# Patient Record
Sex: FEMALE | URBAN - METROPOLITAN AREA
[De-identification: names, ages, dates, MRNs, and addresses within clinical notes are randomized per-mention and may not be internally consistent; named-entity substitution may affect disease eponyms.]

---

## 2017-06-12 ENCOUNTER — TRANSFERRED RECORDS (OUTPATIENT)
Dept: HEALTH INFORMATION MANAGEMENT | Facility: CLINIC | Age: 32
End: 2017-06-12

## 2017-07-05 ENCOUNTER — MEDICAL CORRESPONDENCE (OUTPATIENT)
Dept: HEALTH INFORMATION MANAGEMENT | Facility: CLINIC | Age: 32
End: 2017-07-05

## 2017-07-26 ENCOUNTER — TRANSFERRED RECORDS (OUTPATIENT)
Dept: HEALTH INFORMATION MANAGEMENT | Facility: CLINIC | Age: 32
End: 2017-07-26

## 2017-07-31 ENCOUNTER — PRE VISIT (OUTPATIENT)
Dept: RHEUMATOLOGY | Facility: CLINIC | Age: 32
End: 2017-07-31

## 2017-07-31 NOTE — TELEPHONE ENCOUNTER
1.  Date/reason for appt:  08/09/17   RA    2.  Referring provider:  Dr Mendes, [facility]    3.  Call to patient (Yes / No - short description):  Yes, left vmsg for pt to return call    Where else may she have been seen/when related to this issue    Need email to send JOE (s)    4.  Previous care at / records requested from:  [facility]

## 2017-08-03 NOTE — TELEPHONE ENCOUNTER
Pt stated she's been going to [facility] [place] locations for some time, sees Rheum.  Has records, blood work, injections and imaging.    Emailed JOE's to [email or web address]

## 2017-08-03 NOTE — TELEPHONE ENCOUNTER
Records received from [facility].   Included  Office notes: 7/26/17  Other: med list     Missing/needed records: Additional records in [place]

## 2017-08-04 NOTE — TELEPHONE ENCOUNTER
Received signed JOE forms and faxed with cover sheets for RA records to [facility]

## 2017-08-04 NOTE — TELEPHONE ENCOUNTER
Records received from [facility].   Included  Office notes: 6/16/17, 6/12/17, 6/11/13, 4/14/10, 3/22/09, 12/5/08  Radiology reports: cervical 12/5/08  Other: labs     Per fax, will need to request image if needed.

## 2017-08-09 ENCOUNTER — OFFICE VISIT (OUTPATIENT)
Dept: RHEUMATOLOGY | Facility: CLINIC | Age: 32
End: 2017-08-09
Attending: NURSE PRACTITIONER
Payer: COMMERCIAL

## 2017-08-09 VITALS
SYSTOLIC BLOOD PRESSURE: 123 MMHG | OXYGEN SATURATION: 98 % | DIASTOLIC BLOOD PRESSURE: 80 MMHG | WEIGHT: 155.7 LBS | HEART RATE: 67 BPM | BODY MASS INDEX: 32.68 KG/M2 | HEIGHT: 58 IN | TEMPERATURE: 97.9 F

## 2017-08-09 DIAGNOSIS — M25.50 PAIN IN JOINT, MULTIPLE SITES: Primary | ICD-10-CM

## 2017-08-09 DIAGNOSIS — M79.7 FIBROMYALGIA: ICD-10-CM

## 2017-08-09 DIAGNOSIS — M25.50 PAIN IN JOINT, MULTIPLE SITES: ICD-10-CM

## 2017-08-09 LAB
ALBUMIN SERPL-MCNC: 3.2 G/DL (ref 3.4–5)
ALP SERPL-CCNC: 75 U/L (ref 40–150)
ALT SERPL W P-5'-P-CCNC: 19 U/L (ref 0–50)
ANION GAP SERPL CALCULATED.3IONS-SCNC: 7 MMOL/L (ref 3–14)
AST SERPL W P-5'-P-CCNC: 14 U/L (ref 0–45)
BASOPHILS # BLD AUTO: 0 10E9/L (ref 0–0.2)
BASOPHILS NFR BLD AUTO: 0.3 %
BILIRUB SERPL-MCNC: 0.4 MG/DL (ref 0.2–1.3)
BUN SERPL-MCNC: 9 MG/DL (ref 7–30)
CALCIUM SERPL-MCNC: 8.8 MG/DL (ref 8.5–10.1)
CHLORIDE SERPL-SCNC: 107 MMOL/L (ref 94–109)
CO2 SERPL-SCNC: 24 MMOL/L (ref 20–32)
CREAT SERPL-MCNC: 0.67 MG/DL (ref 0.52–1.04)
CRP SERPL-MCNC: 4.5 MG/L (ref 0–8)
DIFFERENTIAL METHOD BLD: NORMAL
EOSINOPHIL # BLD AUTO: 0.4 10E9/L (ref 0–0.7)
EOSINOPHIL NFR BLD AUTO: 3.5 %
ERYTHROCYTE [DISTWIDTH] IN BLOOD BY AUTOMATED COUNT: 12.1 % (ref 10–15)
ERYTHROCYTE [SEDIMENTATION RATE] IN BLOOD BY WESTERGREN METHOD: 19 MM/H (ref 0–20)
GFR SERPL CREATININE-BSD FRML MDRD: ABNORMAL ML/MIN/1.7M2
GLUCOSE SERPL-MCNC: 101 MG/DL (ref 70–99)
HCT VFR BLD AUTO: 39.3 % (ref 35–47)
HGB BLD-MCNC: 13.3 G/DL (ref 11.7–15.7)
IMM GRANULOCYTES # BLD: 0 10E9/L (ref 0–0.4)
IMM GRANULOCYTES NFR BLD: 0.3 %
LYMPHOCYTES # BLD AUTO: 3.5 10E9/L (ref 0.8–5.3)
LYMPHOCYTES NFR BLD AUTO: 34.3 %
MCH RBC QN AUTO: 32.6 PG (ref 26.5–33)
MCHC RBC AUTO-ENTMCNC: 33.8 G/DL (ref 31.5–36.5)
MCV RBC AUTO: 96 FL (ref 78–100)
MONOCYTES # BLD AUTO: 0.5 10E9/L (ref 0–1.3)
MONOCYTES NFR BLD AUTO: 5.1 %
NEUTROPHILS # BLD AUTO: 5.8 10E9/L (ref 1.6–8.3)
NEUTROPHILS NFR BLD AUTO: 56.5 %
NRBC # BLD AUTO: 0 10*3/UL
NRBC BLD AUTO-RTO: 0 /100
PLATELET # BLD AUTO: 209 10E9/L (ref 150–450)
POTASSIUM SERPL-SCNC: 4.3 MMOL/L (ref 3.4–5.3)
PROT SERPL-MCNC: 6.9 G/DL (ref 6.8–8.8)
RBC # BLD AUTO: 4.08 10E12/L (ref 3.8–5.2)
SODIUM SERPL-SCNC: 138 MMOL/L (ref 133–144)
WBC # BLD AUTO: 10.2 10E9/L (ref 4–11)

## 2017-08-09 PROCEDURE — 86140 C-REACTIVE PROTEIN: CPT | Performed by: NURSE PRACTITIONER

## 2017-08-09 PROCEDURE — 99212 OFFICE O/P EST SF 10 MIN: CPT | Mod: ZF

## 2017-08-09 PROCEDURE — 80053 COMPREHEN METABOLIC PANEL: CPT | Performed by: NURSE PRACTITIONER

## 2017-08-09 PROCEDURE — 85652 RBC SED RATE AUTOMATED: CPT | Performed by: NURSE PRACTITIONER

## 2017-08-09 PROCEDURE — 85025 COMPLETE CBC W/AUTO DIFF WBC: CPT | Performed by: NURSE PRACTITIONER

## 2017-08-09 PROCEDURE — 36415 COLL VENOUS BLD VENIPUNCTURE: CPT | Performed by: NURSE PRACTITIONER

## 2017-08-09 RX ORDER — FLUTICASONE PROPIONATE 50 MCG
1 SPRAY, SUSPENSION (ML) NASAL 2 TIMES DAILY
COMMUNITY

## 2017-08-09 ASSESSMENT — PAIN SCALES - GENERAL: PAINLEVEL: EXTREME PAIN (9)

## 2017-08-09 NOTE — PROGRESS NOTES
"Formerly [facility] - Rheumatology Clinic Visit        Chiqui Loyd  is a 32 year old here for possible CTD and +scleroderma KARLA. Previous dx juvenile rheumatoid arthritis (neck, wrists, left knee), +scleroderma low positive 1.6, raynauds and fibromyalgia.  6/2017 -JAMILAH,  -fior, -RNP,  -sm -ssa- ssb +low positive scl-70 1.6. 6/2015 -RF -CCP. -HCV -HIV NL TSH 9-2016 +CRP in past 20 in 2009.  Past meloxicam. Piroxicam, methotrexate, ibuprofen. Allergy-sulfa (rash). Cortisone injections 3 in past, 2 weeks ago 6-27 +inflammation on aspiration (cloudy/yellow fluid Tot nuc cell 19,740, neutrophil 53%, lymp 24%, mono 23% and no crystals)  left knee. Prednisone-not tolerated (mood swings, tired) not improved diffuse pains only took for 3 days this was unsure mg this last was within 1 yr. NSAIDs no help. Undergoing for disability. Here with uncle. Records scanned (didnt have all rheum records) and care everywhere accessed.     Records from [place]--last OV 6-2017 (2015) and cervical reports .  Dx 10 y/o RA at [facility] [joint pain, weight loss, the eladio's apple, wrists], then seen Dr. Smith [facility], then Dr. Romero ([place]--cervical spine mobility issues, knee synovitis ). Dr. Babin (retired) her PCP took over then \"in and out of remission from her pregnancies\"     When she was young, joint pains [wrists, loosing weight, not able to move neck, eladio's apple 'was causing not able for eat\"]--sulfalalzine --allergy, then took methotrexate oral once a week (helped, and tolerated) and a 2nd medication but not sure thinks she was taking until was 18 yr old. Controlled and in remission, no flares. 2004 undergoing divorce, flexaril, tramadol, toradol and other not DMARD told this was not was RA was something else. Then seen Dr. Caldera dx fibromyalgia now undergoing at [place] this was a 2 day program June 2015. Got pregnant. last rheumatology in [place] Dr. Caldera, last appt 6- " "established in 2008 wishes to transfer care to here. Dr. Caldera ordered echo and PFT -not done will be done after pain management consult. No meds from Dr. Caldera Dx as \"widespread chronic pain\" told was no evidence of MITRA or scleroderma, f/u in 1 year and who referred to chronic pain management. Will be doing this at [place]. Last 7- who ordered PT. Will be meeting with pyschiatrist for possible bipolar.  The pain clinic- Will be doing PT 2x week, seeing psychiatry to go off the cymbalta as not helping (lyrica not helped), going on to zoloft. The PCP tried have failed for this pain. PCP ordered left knee injection 2 weeks ago, then Dr. Willis didn't feel this would help so Dr. Michael (PCP) did an injection    Diffuse pain, everyday widespread pain 7 /10, the worst in her how back then left knee, shoulders and base of the neck 9/10. Still hard to swallow. Pain has been progressively worse \"now its burning shoulders and down my back\", drop things, hands turn white, feels like her low back is going to collapse and \"crumble\" and the knee is \"jelly\", poor sleep. Mornings is the worst, some days its better as the day goes on. Most of the pain in neck shoulder, lowers back. Left knee and toes \"tingling blue and numb\". Left knee swells as times, when bend her fingers are sore and weak, achey are hard to bend back. Hands get cold and hard to feel then then the right 2-3 fingers turn white but her toes goes white then purple (tips of the fingers). Progressive symptoms since this past winter. Sensitive to the cold. +\"fishnet pattern on my legs\".     Muscles, joints all bother (mostly wrists, finger joints, left knee) , her SI bother +back pain when laying down not helped with cold heat stretching or walking, worse with movements. Low back always sore and tender, but sometime SI if touched. Meloxicam --stopped due to stomach issues (seen in ED). Denies tick bites or dactylitis.     Nothing has helped she reports. No recent " "travels     PMH:  Medical-anxiety, yeast, UTI, depression, ?bipolar--agreed was not after seen psych in 2013 turned out was ADHD now on adderall, dx JRA at youth,gestation diabetes x2, pre-eclampasia, influenza A in 2016   Surgical-None   No blood transfusions  Injury-tailbone fracture     FH:  No autoimmune disorders, psoriasis, UC, crohn's, SLE, RA, PsA, gout.  No MS or cancer in family  Mother-bipolar, paranoid schizoprhenia, mild MR  Father-Not known  Siblings-1/2 sister testing now anxiety depression fibromyalia, ADD and bipolar  Children-healthy      SH:  Rare Alcohol. + 1 PPDSmoking. No IVDU. 5 Children. Right handed.  Not working since Feb 2016     [facility] personally reviewed and updated by me.    ROS:  Negative hairloss, keratoconjunctivitis sicca, pleurisy, oral/nasal or ulcerations, inflammatory eye disease, inflammatory bowel disease, dactylitis, tenosynovitis, or enthespathy, blood clots, gout, psoriasis, UC, crohn's. No temporal headache, no jaw claudication, no scalp tenderness, vision changes, carotidynia, cough  +see above   +sun sensitivities (hot and skin red pattern and face \"butterfly race\", dizzy, threw-up)  +question if inflammatory bowel disease --went to ED ([place]) working with PCP at this time   +miscarriages at 5-6 weeks   +bowels are diarrhea then constipation, no blood in stool nor ABD pain ok with gasX   CONSTITUTIONAL: No fevers, night sweats or unintentional weight change. No acute distress, swollen glands  EYES: No vision change, diplopia, pain in eyes or red eyes   EARS, NOSE, MOUTH, THROAT: No tinnitus or hearing change, no epistaxis or nasal discharge, no oral lesions, throat clear. Normal saliva pool.  No drymouth. No thyroid enlargement  CARDIOVASCULAR: No chest pain, palpitations, or pain with walking, no orthopnea or PND   RESPIRATORY: No dyspnea, cough, shortness of breath or wheezing. No pleurisy.   GI: No nausea, vomiting, diarrhea or constipation, no abdominal pain, or blood in " "stools.   : No change in urine, no dysuria or hematuria   MUSCKL:  No enthesitis, plantar fascitis or heel pain.   INTEGUMENTARY: No concerning lesions or moles   NEURO: No loss of strength or sensation, no numbness or tingling, no tremor, no dizziness, no headache. No falls   ENDO: No polyuria or polydipsia, no temperature intolerance   HEME/LYMPH:No concerning bumps, bleeding problems, or swollen lymph nodes.    Otherwise 14 point ROS obtained, reviewed and found negative.     Physical exam:  Vitals: Blood pressure 123/80, pulse 67, temperature 97.9  F (36.6  C), temperature source Oral, height 1.473 m (4' 10\"), weight 70.6 kg (155 lb 11.2 oz), SpO2 98 %.  Wt Readings from Last 4 Encounters:   17 70.6 kg (155 lb 11.2 oz)     Constitutional: WD-WN-WG cooperative  Eyes: nl EOM, PERRLA, vision, conjunctiva, sclera  ENT: nl external ears, nose, hearing, lips, teeth, gums, throat  Neck: no mass or thyroid enlargement  Resp: lungs clear to auscultation, nl to palpation, nl effort  CV: RRR, no murmurs, rubs or gallops, no edema  GI: no ABD mass or tenderness, no HSM  : not tested  Lymph: no cervical or epitrochlear nodes  MS: tender wrists, erythema of fingers, tender alone her spine and all soft tissues. Livedo rash on legs. Tender in ankles and MTPs. +tender in her SI/ITB and traochanteric bursa. Slow to move. +periuncle erythema. All TMJ, neck, shoulder, elbow, wrist, hand, spine, hip, knee, ankle, and foot joints were examined and otherwise found normal. Normal  strength. No active synovitis or deformity. Full ROM. Normal prayer sign. Negative Negative Lhermitte's sign.   Skin: no nail pitting, alopecia, rash  Neuro: nl cranial nerves, strength, sensation, DTRs.   Psych: nl judgement, orientation, memory, affect.      Labs/Imagin Neg chlamydia , gonorrhea, syphilis   HX Total Nuc Cells BF  2017  Comment:   Serous Fluid (Peritoneal, Pleural, Pericardial) Reference Range:  < 500 " cells/mcL  Synovial Fluid Reference Range:  < 150 cells/mcL  BAL Reference Range:  No normal range has been established for total nucleated cell counts     CELLSMCL   HXSource BF      HXBody Fluid Color Yellow     HX Gross Appear BF Cloudy     Neutrophils, Body Fluid 53  Comment:   Serous Fluid (Peritoneal, Pleural, Pericardial) Reference Range:  < 25%  Synovial Fluid Reference Range:  < 25%  BAL Reference Range:  No normal range has been established for the differential.         HXLymphocytes BF 24  Comment:   Serous Fluid (Peritoneal, Pleural, Pericardial) Reference Range:  N/A  Synovial Fluid Reference Range:  < 75%  BAL Reference Range:  No normal range has been established for the differential.         HXMono/Macro BF 23  Comment:   Serous Fluid (Peritoneal, Pleural, Pericardial) Reference Range:  N/A  Synovial Fluid Reference Range:  < 70%  BAL Reference Range:  No normal range has been established for the differential.        R Knee Bilat AP Std + Knee Lt 3 view  6/27/2017 16:12:10   Result Impression    Small effusion, otherwise negative left knee.    Result Narrative       EXAM: XR Knee Bilat AP Std + Knee Lt 3 view     INDICATION: knee pain, hx JRA     COMPARISON: Left knee of 6/8/2009.      FINDINGS: There is a small knee joint effusion. No fracture or   destructive lesion is identified. Knee joint spaces are preserved   without significant degenerative or erosive arthritis. Incidentally   visualized right knee on the AP and PA views is unremarkable.     Addendum by Pete Bravo M.D. on 04/18/2017  8:41 AM  RAD^^^MA  CT Abdomen/Pelvis w/ contrast  4/18/2017 08:41:48   Result Narrative       Exam: CT of abdomen and pelvis     History:   Abdominal pain      Comparison: None     Technique: CT of the abdomen and pelvis with IV contrast and without   oral contrast. Images were obtained from base of lungs to below the   pelvis. The images were interpreted in bone, abdominal, and lung   windows.      Findings: Lung bases are clear. The upper abdominal organs are within   normal limits. There is no upper abdominal adenopathy. The upper   abdominal vasculature is patent. There is no free fluid in the pelvis.   The appendix is within normal limits. There is a dominant right   ovarian follicle. The genitourinary structures of the pelvis are   within normal limits. There are scattered diverticula of the colon.   There is a moderate amount of stool within the proximal colon. There   is no abnormally dilated loop of large or small bowel. There is a   small left renal stone. There is no obstructing nephro or   ureterolithiasis. There is no worrisome bone lesion.     Impression:    1. No acute abdominal pathology identified. Nonobstructing left renal   stone seen.   2. Mild diverticulosis with questionable descending colonic wall   thickening versus decompressed colon. Suggest correlation with   clinical symptoms.      Addendum by ProviderPete M.D. on 07/30/2015 11:11 AM  RAD^^^MA  XR Cervical Spine 4 views  7/30/2015 11:11:32   Result Narrative       Exam:   XR Cervical Spine 4 or 5 views     Clinical history: JRA with abnormal MRI     Comparison: 5/11/15     Findings: The cervical spine vertebral body height and alignment is   preserved. The soft tissues are within normal limits. There is no   cortical lucency to suggest fracture. Flexion and extension views are   within normal limits.     Impression: No evidence of instability.      Stable cervical spine, no fracture or degenerative change.   Unchanged widening of the articulation of the odontoid and anterior   arch of C1.    Result Narrative   3-2017     EXAM: XR Cervical Spine 4 or 5 views     INDICATION: Neck pain, RA, MRI abnormality of Dens 2015     COMPARISON: 7/30/2015.      FINDINGS: Prevertebral soft tissues are unremarkable. The 7 cervical   vertebrae are well visualized and normal in height without evidence of   fracture or destructive lesions.  Again noted is some widening of the   articulation of the odontoid and anterior arch of C1 but this is   stable and unchanged. Disc spaces are preserved without degenerative   change. Oblique views show normal facets and neural foramina   bilaterally.      MR Cervical Spine w/ + w/o contrast  7/22/2015 16:44:41Addendum by ProviderPete M.D. on 07/22/2015  4:30 PM  RAD^^^MA  MR Cervical Spine w/ + w/o contrast  7/22/2015 16:30:00   Result Narrative       Multiecho and multiplanar views of of the cervical spine were   obtained prior to and following the IV administration of 7 mL of   contrast. This was compared to a previous study of 06/12/2015.     There is unchanged pannus formation surrounding the tip of the dens,   producing unchanged, 7 mm of widening between the anterior arch of C1   and the dens. The tip of the dens is again noted to be eroded, as   well as erosion of a portion of the clivus. These findings are   stable.     There is no pathological enhancement. Again noted is congenital   narrowing of the spinal canal throughout the cervical spine. There is   mild unchanged disc bulging at the C4-5 level, with no evidence of   any significant spinal stenosis or neural foraminal stenosis. The   visualized brainstem appears normal.     Impression:   1. Unchanged pannus formation involving the C1 level as described   above, producing erosion of the tip of the dens as well as a portion   of the clivus.   2. Unchanged mild diffuse posterior disc bulging at the C4-5 level,   with no significant spinal stenosis or neural foraminal stenosis.   3. 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            Impression/Plan:  1. Polyarthralgias. Previous dx of JRA  2. Low positive scleroderma KARLA--Dr. Caldera ordered echo and complete PFTs  3. Raynauds syndrome   4. Smoker   5. Neck pain, x-rays 3-2017; MRI 7-2015 congenital narrowing with unchanged pannus C1-erosion tip of dens  6.  Fibromyalgia. Dx at fibromyalgia clinic. Per pain team   6. Intolerant to prednisone   7. Anxiety, depression, ADHD, chronic pain. Under care of psych and pain team   8. Allergy sulfa     This is a 31 y/o female with dx JRA at age 11 previously treated with multiple NSAIDs and methotrexate, essentially went in and out of remission during her pregnancies then now been under the care of Dr. Caldera ([place]).     Complaints of raynaud's syndrome (toes, finger turn white), low positive scleroderma KARLA with the other serologies negative, diffuse neck/spine and SI pain, diffuse overall pain, swallowing issues, complaints of sun-sensitivities and butterfly rash, morning symptoms without clear improvement with NSAIDS (more recently meloxicam), and not able to tolerate prednisone. Recent knee aspiration showing +inflammation and injection, improved. The pain is diffuse, severe and impacting her QoL/function. Exam shows tenderness alone her spine, and diffusely on her joints and soft tissues, and SI joints where this does bring her to tears. I don't appreciate any clear synovitis or dactylitis on her exam, nor is meloxicam giving her relief. Not able to do a prednisone trial. She has previously been on MTX when she was younger, but the history on this is unclear when and for how how or if she had a response. She has a negative JAMILAH, RF and CCP. Smoking is a risk factor for RA. However, her recent knee aspiration showed +inflammation process and STD testing negative (with OB). Not able to use sulfa.     -I will ask her to do labs, x-rays of feet, hands, SI joints and wrists to evaluate for erosions.   -I will ask her to RTC with rheum MD for further evaluation given +scleroderma KARLA and raynauds, and other non-specific symptoms. Also to order echo, complete PFT and other testing as deemed appropriate.    --She should f/u with PCP about her swallowing     The patient understood the rational for the diagnosis and treatment plan.  Patient shared in the decision making. All questions were answered to best of my ability and the patient's satisfaction  Johan MARTINEZ, CNP, MSN  [facility]  Department of Rheumatology & Autoimmune Disorders

## 2017-08-09 NOTE — MR AVS SNAPSHOT
After Visit Summary   8/9/2017    Chiqui Loyd    MRN: [id number]           Patient Information     Date Of Birth          1985        Visit Information        Provider Department      8/9/2017 8:00 AM Johan Zhou APRN [facility] Rheumatology        Today's Diagnoses     Pain in joint, multiple sites    -  1    Fibromyalgia           Follow-ups after your visit        Follow-up notes from your care team     Return for Labs + X-rays Today.      Your next 10 appointments already scheduled     Aug 09, 2017 10:00 AM CDT   XR SACROILIAC JOINT 1/2 VIEWS with UCXR1   [facility] ([facility])    [address] [place] [id number] [phone]           Please bring a list of your current medicines to your exam. (Include vitamins, minerals and over-thecounter medicines.) Leave your valuables at home.  Tell your doctor if there is a chance you may be pregnant.  You do not need to do anything special for this exam.            Aug 09, 2017 10:20 AM CDT   XR WRIST BILATERAL 2 VIEWS with UCXR1   [facility] ([facility])    [address] [id number] [phone]           Please bring a list of your current medicines to your exam. (Include vitamins, minerals and over-thecounter medicines.) Leave your valuables at home.  Tell your doctor if there is a chance you may be pregnant.  You do not need to do anything special for this exam.            Aug 09, 2017 10:40 AM CDT   XR FOOT BILATERAL G/E 3 VIEWS with UCXR1   [facility] ([facility])    [address] [id number] [phone]           Please bring a list of your current medicines to your exam. (Include vitamins, minerals and over-thecounter medicines.) Leave your valuables at home.  Tell your doctor if there is a chance you may  be pregnant.  You do not need to do anything special for this exam.            Aug 09, 2017 11:05 AM CDT   XR HAND BILATERAL G/E 3 VIEWS with UCXR1   [facility] ([facility])    [address]  1st Floor  [facility] [id number] [phone]           Please bring a list of your current medicines to your exam. (Include vitamins, minerals and over-thecounter medicines.) Leave your valuables at home.  Tell your doctor if there is a chance you may be pregnant.  You do not need to do anything special for this exam.            Aug 17, 2017  3:00 PM CDT   (Arrive by 2:45 PM)   Return Visit with Valerie Gamez MD   [facility]umatology ([facility])    [address]  3rd Floor  [facility] [id number] [phone]              Future tests that were ordered for you today     Open Future Orders        Priority Expected Expires Ordered    CBC with platelets differential Routine 8/9/2017 9/9/2017 8/9/2017    CRP inflammation Routine 8/9/2017 9/9/2017 8/9/2017    Erythrocyte sedimentation rate auto Routine 8/9/2017 9/9/2017 8/9/2017    Comprehensive metabolic panel Routine 8/9/2017 9/9/2017 8/9/2017    XR Hand Bilateral G/E 3 Views Routine 8/9/2017 9/9/2017 8/9/2017    XR Foot Bilateral G/E 3 Views Routine 8/9/2017 9/9/2017 8/9/2017    XR Wrist Bilateral 2 Views Routine 8/9/2017 9/9/2017 8/9/2017    XR Sacroiliac Joint 1/2 Views Routine 8/9/2017 9/9/2017 8/9/2017            Who to contact     If you have questions or need follow up information about today's clinic visit or your schedule please contact [facility] RHEUMATOLOGY directly at [phone].  Normal or non-critical lab and imaging results will be communicated to you by MyChart, letter or phone within 4 business days after the clinic has received the results. If you do not hear from us within 7 days, please contact the clinic through MyChart or phone. If you have a  "critical or abnormal lab result, we will notify you by phone as soon as possible.  Submit refill requests through [employer] or call your pharmacy and they will forward the refill request to us. Please allow 3 business days for your refill to be completed.          Additional Information About Your Visit        realSociablehart Information     [employer] gives you secure access to your electronic health record. If you see a primary care provider, you can also send messages to your care team and make appointments. If you have questions, please call your primary care clinic.  If you do not have a primary care provider, please call [phone] and they will assist you.        Care EveryWhere ID     This is your Care EveryWhere ID. This could be used by other organizations to access your [place] medical records  [id number]        Your Vitals Were     Pulse Temperature Height Pulse Oximetry BMI (Body Mass Index)       67 97.9  F (36.6  C) (Oral) 1.473 m (4' 10\") 98% 32.54 kg/m2        Blood Pressure from Last 3 Encounters:   08/09/17 123/80    Weight from Last 3 Encounters:   08/09/17 70.6 kg (155 lb 11.2 oz)               Primary Care Provider    None Specified       No primary provider on file.        Equal Access to Services     [facility]RA [facility] : Hadii jer Avelar, waaxda ludell, qaybta kaalmada jaron, faviola parsons. So [facility] [phone].    ATENCIÓN: Si habla español, tiene a mcghee disposición servicios gratuitos de asistencia lingüística. Llame al [phone].    We comply with applicable federal civil rights laws and [place] laws. We do not discriminate on the basis of race, color, national origin, age, disability sex, sexual orientation or gender identity.            Thank you!     Thank you for choosing [facility]  for your care. Our goal is always to provide you with excellent care. Hearing back from our patients is one way we can continue to improve our services. " Please take a few minutes to complete the written survey that you may receive in the mail after your visit with us. Thank you!             Your Updated Medication List - Protect others around you: Learn how to safely use, store and throw away your medicines at www.disposemymeds.org.      Notice  As of 8/9/2017  9:49 AM    You have not been prescribed any medications.

## 2017-08-09 NOTE — LETTER
"8/9/2017      RE: Chiqui Loyd  [address]  [facility] [address]       [facility] - Rheumatology Clinic Visit        Chiqui Loyd  is a 32 year old here for possible CTD and +scleroderma KARLA. Previous dx juvenile rheumatoid arthritis (neck, wrists, left knee), +scleroderma low positive 1.6, raynauds and fibromyalgia.  6/2017 -JAMILAH,  -fior, -RNP,  -sm -ssa- ssb +low positive scl-70 1.6. 6/2015 -RF -CCP. -HCV -HIV NL TSH 9-2016 +CRP in past 20 in 2009.  Past meloxicam. Piroxicam, methotrexate, ibuprofen. Allergy-sulfa (rash). Cortisone injections 3 in past, 2 weeks ago 6-27 +inflammation on aspiration (cloudy/yellow fluid Tot nuc cell 19,740, neutrophil 53%, lymp 24%, mono 23% and no crystals)  left knee. Prednisone-not tolerated (mood swings, tired) not improved diffuse pains only took for 3 days this was unsure mg this last was within 1 yr. NSAIDs no help. Undergoing for disability. Here with uncle. Records scanned (didnt have all rheum records) and care everywhere accessed.     Records from [place]--last OV 6-2017 (2015) and cervical reports .  Dx 10 y/o RA at [facility] [joint pain, weight loss, the eladio's apple, wrists], then seen Dr. Smith [facility], then Dr. Romero ([place]--cervical spine mobility issues, knee synovitis ). Dr. Babin (retired) her PCP took over then \"in and out of remission from her pregnancies\"     When she was young, joint pains [wrists, loosing weight, not able to move neck, eladio's apple 'was causing not able for eat\"]--sulfalalzine --allergy, then took methotrexate oral once a week (helped, and tolerated) and a 2nd medication but not sure thinks she was taking until was 18 yr old. Controlled and in remission, no flares. 2004 undergoing divorce, flexaril, tramadol, toradol and other not DMARD told this was not was RA was something else. Then seen Dr. Caldera dx fibromyalgia now undergoing at [facility] this was a 2 day program June " "2015. Got pregnant. last rheumatology in [place] Dr. Caldera, last appt 6- established in 2008 wishes to transfer care to here. Dr. Caldera ordered echo and PFT -not done will be done after pain management consult. No meds from Dr. Caldera Dx as \"widespread chronic pain\" told was no evidence of MITRA or scleroderma, f/u in 1 year and who referred to chronic pain management. Will be doing this at [place]. Last 7- who ordered PT. Will be meeting with pyschiatrist for possible bipolar.  The pain clinic- Will be doing PT 2x week, seeing psychiatry to go off the cymbalta as not helping (lyrica not helped), going on to zoloft. The PCP tried have failed for this pain. PCP ordered left knee injection 2 weeks ago, then Dr. Willis didn't feel this would help so Dr. Michael (PCP) did an injection    Diffuse pain, everyday widespread pain 7 /10, the worst in her how back then left knee, shoulders and base of the neck 9/10. Still hard to swallow. Pain has been progressively worse \"now its burning shoulders and down my back\", drop things, hands turn white, feels like her low back is going to collapse and \"crumble\" and the knee is \"jelly\", poor sleep. Mornings is the worst, some days its better as the day goes on. Most of the pain in neck shoulder, lowers back. Left knee and toes \"tingling blue and numb\". Left knee swells as times, when bend her fingers are sore and weak, achey are hard to bend back. Hands get cold and hard to feel then then the right 2-3 fingers turn white but her toes goes white then purple (tips of the fingers). Progressive symptoms since this past winter. Sensitive to the cold. +\"fishnet pattern on my legs\".     Muscles, joints all bother (mostly wrists, finger joints, left knee) , her SI bother +back pain when laying down not helped with cold heat stretching or walking, worse with movements. Low back always sore and tender, but sometime SI if touched. Meloxicam --stopped due to stomach issues (seen in ED). " "Denies tick bites or dactylitis.     Nothing has helped she reports. No recent travels     PMH:  Medical-anxiety, yeast, UTI, depression, ?bipolar--agreed was not after seen psych in 2013 turned out was ADHD now on adderall, dx JRA at youth,gestation diabetes x2, pre-eclampasia, influenza A in 2016   Surgical-None   No blood transfusions  Injury-tailbone fracture     FH:  No autoimmune disorders, psoriasis, UC, crohn's, SLE, RA, PsA, gout.  No MS or cancer in family  Mother-bipolar, paranoid schizoprhenia, mild MR  Father-Not known  Siblings-1/2 sister testing now anxiety depression fibromyalia, ADD and bipolar  Children-healthy      SH:  Rare Alcohol. + 1 PPDSmoking. No IVDU. 5 Children. Right handed.  Not working since Feb 2016     PMSH personally reviewed and updated by me.    ROS:  Negative hairloss, keratoconjunctivitis sicca, pleurisy, oral/nasal or ulcerations, inflammatory eye disease, inflammatory bowel disease, dactylitis, tenosynovitis, or enthespathy, blood clots, gout, psoriasis, UC, crohn's. No temporal headache, no jaw claudication, no scalp tenderness, vision changes, carotidynia, cough  +see above   +sun sensitivities (hot and skin red pattern and face \"butterfly race\", dizzy, threw-up)  +question if inflammatory bowel disease --went to ED (CT) working with PCP at this time   +miscarriages at 5-6 weeks   +bowels are diarrhea then constipation, no blood in stool nor ABD pain ok with gasX   CONSTITUTIONAL: No fevers, night sweats or unintentional weight change. No acute distress, swollen glands  EYES: No vision change, diplopia, pain in eyes or red eyes   EARS, NOSE, MOUTH, THROAT: No tinnitus or hearing change, no epistaxis or nasal discharge, no oral lesions, throat clear. Normal saliva pool.  No drymouth. No thyroid enlargement  CARDIOVASCULAR: No chest pain, palpitations, or pain with walking, no orthopnea or PND   RESPIRATORY: No dyspnea, cough, shortness of breath or wheezing. No pleurisy.   GI: " "No nausea, vomiting, diarrhea or constipation, no abdominal pain, or blood in stools.   : No change in urine, no dysuria or hematuria   MUSCKL:  No enthesitis, plantar fascitis or heel pain.   INTEGUMENTARY: No concerning lesions or moles   NEURO: No loss of strength or sensation, no numbness or tingling, no tremor, no dizziness, no headache. No falls   ENDO: No polyuria or polydipsia, no temperature intolerance   HEME/LYMPH:No concerning bumps, bleeding problems, or swollen lymph nodes.    Otherwise 14 point ROS obtained, reviewed and found negative.     Physical exam:  Vitals: Blood pressure 123/80, pulse 67, temperature 97.9  F (36.6  C), temperature source Oral, height 1.473 m (4' 10\"), weight 70.6 kg (155 lb 11.2 oz), SpO2 98 %.  Wt Readings from Last 4 Encounters:   17 70.6 kg (155 lb 11.2 oz)     Constitutional: WD-WN-WG cooperative  Eyes: nl EOM, PERRLA, vision, conjunctiva, sclera  ENT: nl external ears, nose, hearing, lips, teeth, gums, throat  Neck: no mass or thyroid enlargement  Resp: lungs clear to auscultation, nl to palpation, nl effort  CV: RRR, no murmurs, rubs or gallops, no edema  GI: no ABD mass or tenderness, no HSM  : not tested  Lymph: no cervical or epitrochlear nodes  MS: tender wrists, erythema of fingers, tender alone her spine and all soft tissues. Livedo rash on legs. Tender in ankles and MTPs. +tender in her SI/ITB and traochanteric bursa. Slow to move. +periuncle erythema. All TMJ, neck, shoulder, elbow, wrist, hand, spine, hip, knee, ankle, and foot joints were examined and otherwise found normal. Normal  strength. No active synovitis or deformity. Full ROM. Normal prayer sign. Negative Negative Lhermitte's sign.   Skin: no nail pitting, alopecia, rash  Neuro: nl cranial nerves, strength, sensation, DTRs.   Psych: nl judgement, orientation, memory, affect.      Labs/Imagin Neg chlamydia , gonorrhea, syphilis   HX Total Nuc Cells BF  2017  Comment: "   Serous Fluid (Peritoneal, Pleural, Pericardial) Reference Range:  < 500 cells/mcL  Synovial Fluid Reference Range:  < 150 cells/mcL  BAL Reference Range:  No normal range has been established for total nucleated cell counts     CELLSMCL   HXSource BF      HXBody Fluid Color Yellow     HX Gross Appear BF Cloudy     Neutrophils, Body Fluid 53  Comment:   Serous Fluid (Peritoneal, Pleural, Pericardial) Reference Range:  < 25%  Synovial Fluid Reference Range:  < 25%  BAL Reference Range:  No normal range has been established for the differential.         HXLymphocytes BF 24  Comment:   Serous Fluid (Peritoneal, Pleural, Pericardial) Reference Range:  N/A  Synovial Fluid Reference Range:  < 75%  BAL Reference Range:  No normal range has been established for the differential.         HXMono/Macro BF 23  Comment:   Serous Fluid (Peritoneal, Pleural, Pericardial) Reference Range:  N/A  Synovial Fluid Reference Range:  < 70%  BAL Reference Range:  No normal range has been established for the differential.        R Knee Bilat AP Std + Knee Lt 3 view  6/27/2017 16:12:10   Result Impression    Small effusion, otherwise negative left knee.    Result Narrative       EXAM: XR Knee Bilat AP Std + Knee Lt 3 view     INDICATION: knee pain, hx JRA     COMPARISON: Left knee of 6/8/2009.      FINDINGS: There is a small knee joint effusion. No fracture or   destructive lesion is identified. Knee joint spaces are preserved   without significant degenerative or erosive arthritis. Incidentally   visualized right knee on the AP and PA views is unremarkable.     Addendum by ProviderPete M.D. on 04/18/2017  8:41 AM  RAD^^^MA  CT Abdomen/Pelvis w/ contrast  4/18/2017 08:41:48   Result Narrative       Exam: CT of abdomen and pelvis     History:   Abdominal pain      Comparison: None     Technique: CT of the abdomen and pelvis with IV contrast and without   oral contrast. Images were obtained from base of lungs to below the   pelvis.  The images were interpreted in bone, abdominal, and lung   windows.     Findings: Lung bases are clear. The upper abdominal organs are within   normal limits. There is no upper abdominal adenopathy. The upper   abdominal vasculature is patent. There is no free fluid in the pelvis.   The appendix is within normal limits. There is a dominant right   ovarian follicle. The genitourinary structures of the pelvis are   within normal limits. There are scattered diverticula of the colon.   There is a moderate amount of stool within the proximal colon. There   is no abnormally dilated loop of large or small bowel. There is a   small left renal stone. There is no obstructing nephro or   ureterolithiasis. There is no worrisome bone lesion.     Impression:    1. No acute abdominal pathology identified. Nonobstructing left renal   stone seen.   2. Mild diverticulosis with questionable descending colonic wall   thickening versus decompressed colon. Suggest correlation with   clinical symptoms.      Addendum by ProviderPete M.D. on 07/30/2015 11:11 AM  RAD^^^MA  XR Cervical Spine 4 views  7/30/2015 11:11:32   Result Narrative       Exam:   XR Cervical Spine 4 or 5 views     Clinical history: JRA with abnormal MRI     Comparison: 5/11/15     Findings: The cervical spine vertebral body height and alignment is   preserved. The soft tissues are within normal limits. There is no   cortical lucency to suggest fracture. Flexion and extension views are   within normal limits.     Impression: No evidence of instability.      Stable cervical spine, no fracture or degenerative change.   Unchanged widening of the articulation of the odontoid and anterior   arch of C1.    Result Narrative   3-2017     EXAM: XR Cervical Spine 4 or 5 views     INDICATION: Neck pain, RA, MRI abnormality of Dens 2015     COMPARISON: 7/30/2015.      FINDINGS: Prevertebral soft tissues are unremarkable. The 7 cervical   vertebrae are well visualized and  normal in height without evidence of   fracture or destructive lesions. Again noted is some widening of the   articulation of the odontoid and anterior arch of C1 but this is   stable and unchanged. Disc spaces are preserved without degenerative   change. Oblique views show normal facets and neural foramina   bilaterally.      MR Cervical Spine w/ + w/o contrast  7/22/2015 16:44:41Addendum by Provider, VAMSHI Freeman on 07/22/2015  4:30 PM  RAD^^^MA  MR Cervical Spine w/ + w/o contrast  7/22/2015 16:30:00   Result Narrative       Multiecho and multiplanar views of of the cervical spine were   obtained prior to and following the IV administration of 7 mL of   contrast. This was compared to a previous study of 06/12/2015.     There is unchanged pannus formation surrounding the tip of the dens,   producing unchanged, 7 mm of widening between the anterior arch of C1   and the dens. The tip of the dens is again noted to be eroded, as   well as erosion of a portion of the clivus. These findings are   stable.     There is no pathological enhancement. Again noted is congenital   narrowing of the spinal canal throughout the cervical spine. There is   mild unchanged disc bulging at the C4-5 level, with no evidence of   any significant spinal stenosis or neural foraminal stenosis. The   visualized brainstem appears normal.     Impression:   1. Unchanged pannus formation involving the C1 level as described   above, producing erosion of the tip of the dens as well as a portion   of the clivus.   2. Unchanged mild diffuse posterior disc bulging at the C4-5 level,   with no significant spinal stenosis or neural foraminal stenosis.   3. 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            Impression/Plan:  1. Polyarthralgias. Previous dx of JRA  2. Low positive scleroderma KARLA--Dr. Caldera ordered echo and complete PFTs  3. Raynauds syndrome   4. Smoker   5. Neck pain, x-rays 3-2017; MRI  7-2015 congenital narrowing with unchanged pannus C1-erosion tip of dens  6. Fibromyalgia. Dx at fibromyalgia clinic. Per pain team   6. Intolerant to prednisone   7. Anxiety, depression, ADHD, chronic pain. Under care of psych and pain team   8. Allergy sulfa     This is a 31 y/o female with dx JRA at age 11 previously treated with multiple NSAIDs and methotrexate, essentially went in and out of remission during her pregnancies then now been under the care of Dr. Caldera ([place]).     Complaints of raynaud's syndrome (toes, finger turn white), low positive scleroderma KARLA with the other serologies negative, diffuse neck/spine and SI pain, diffuse overall pain, swallowing issues, complaints of sun-sensitivities and butterfly rash, morning symptoms without clear improvement with NSAIDS (more recently meloxicam), and not able to tolerate prednisone. Recent knee aspiration showing +inflammation and injection, improved. The pain is diffuse, severe and impacting her QoL/function. Exam shows tenderness alone her spine, and diffusely on her joints and soft tissues, and SI joints where this does bring her to tears. I don't appreciate any clear synovitis or dactylitis on her exam, nor is meloxicam giving her relief. Not able to do a prednisone trial. She has previously been on MTX when she was younger, but the history on this is unclear when and for how how or if she had a response. She has a negative JAMILAH, RF and CCP. Smoking is a risk factor for RA. However, her recent knee aspiration showed +inflammation process and STD testing negative (with OB). Not able to use sulfa.     -I will ask her to do labs, x-rays of feet, hands, SI joints and wrists to evaluate for erosions.   -I will ask her to RTC with rheum MD for further evaluation given +scleroderma KARLA and raynauds, and other non-specific symptoms. Also to order echo, complete PFT and other testing as deemed appropriate.    --She should f/u with PCP about her swallowing      The patient understood the rational for the diagnosis and treatment plan. Patient shared in the decision making. All questions were answered to best of my ability and the patient's satisfaction    Johan MARTINEZ, CNP, MSN  [facility]  Department of Rheumatology & Autoimmune Disorders

## 2017-08-09 NOTE — NURSING NOTE
"Chief Complaint   Patient presents with     Consult For     Referral for RA.       Initial /80  Pulse 67  Temp 97.9  F (36.6  C) (Oral)  Ht 1.473 m (4' 10\")  Wt 70.6 kg (155 lb 11.2 oz)  SpO2 98%  BMI 32.54 kg/m2 Estimated body mass index is 32.54 kg/(m^2) as calculated from the following:    Height as of this encounter: 1.473 m (4' 10\").    Weight as of this encounter: 70.6 kg (155 lb 11.2 oz).  Medication Reconciliation: complete   Farheen Condon., CMA    "

## 2017-08-17 ENCOUNTER — OFFICE VISIT (OUTPATIENT)
Dept: RHEUMATOLOGY | Facility: CLINIC | Age: 32
End: 2017-08-17
Attending: INTERNAL MEDICINE
Payer: COMMERCIAL

## 2017-08-17 VITALS
WEIGHT: 154.2 LBS | HEIGHT: 58 IN | DIASTOLIC BLOOD PRESSURE: 96 MMHG | HEART RATE: 93 BPM | BODY MASS INDEX: 32.37 KG/M2 | OXYGEN SATURATION: 100 % | SYSTOLIC BLOOD PRESSURE: 137 MMHG

## 2017-08-17 DIAGNOSIS — M65.969 SYNOVITIS OF KNEE: Primary | ICD-10-CM

## 2017-08-17 DIAGNOSIS — Z13.9 SCREENING FOR CONDITION: ICD-10-CM

## 2017-08-17 DIAGNOSIS — M65.969 SYNOVITIS OF KNEE: ICD-10-CM

## 2017-08-17 LAB — TSH SERPL DL<=0.005 MIU/L-ACNC: 1.19 MU/L (ref 0.4–4)

## 2017-08-17 PROCEDURE — 84443 ASSAY THYROID STIM HORMONE: CPT | Performed by: INTERNAL MEDICINE

## 2017-08-17 PROCEDURE — 36415 COLL VENOUS BLD VENIPUNCTURE: CPT | Performed by: INTERNAL MEDICINE

## 2017-08-17 PROCEDURE — 82306 VITAMIN D 25 HYDROXY: CPT | Performed by: INTERNAL MEDICINE

## 2017-08-17 PROCEDURE — 99212 OFFICE O/P EST SF 10 MIN: CPT | Mod: ZF

## 2017-08-17 ASSESSMENT — PAIN SCALES - GENERAL: PAINLEVEL: SEVERE PAIN (7)

## 2017-08-17 NOTE — LETTER
Date:August 21, 2017      Patient was self referred, no letter generated. Do not send.        [facility] Health Information

## 2017-08-17 NOTE — NURSING NOTE
"Chief Complaint   Patient presents with     RECHECK     Follow up for scleroderma, raynauds, and rash       Initial BP (!) 137/96  Pulse 93  Ht 1.473 m (4' 10\")  Wt 69.9 kg (154 lb 3.2 oz)  SpO2 100%  BMI 32.23 kg/m2 Estimated body mass index is 32.23 kg/(m^2) as calculated from the following:    Height as of this encounter: 1.473 m (4' 10\").    Weight as of this encounter: 69.9 kg (154 lb 3.2 oz).  Medication Reconciliation: complete   Shelia Lopez CMA    "

## 2017-08-17 NOTE — LETTER
"8/17/2017      RE: Chiqui Loyd  [address]  [facility] [address]       [facility] - Rheumatology Clinic Visit    # Polyarthralgias. 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 She is a difficult historian due to very labile mood: tearful throughout the appointment, screaming out in pain, states she is having trouble remembering things. Her current most concerning musculoskeletal issues are left knee pain and swelling and low back pain. It's difficult to say whether low back pain is inflammatory, Chiqui simply states \"I don't sleep and I constantly hurt\", \"I'm stiff all over\".  She is also prone to catastrophizing: \"my sugar was high again! Why is my sugar high?\" Bursts into tears. Daytime sugar was 101 with a normal A1c. And shows distrust of medical providers: \"the rheumatologist didn't even listen to me\"    HPI:    Chiqui Loyd  is a 32 year old here for possible CTD and +scleroderma KARLA. Previous dx juvenile rheumatoid arthritis (neck, wrists, left knee), +scleroderma low positive 1.6, raynauds and fibromyalgia.  6/2017 -JAMILAH,  -fior, -RNP,  -sm -ssa- ssb +low positive scl-70 1.6. 6/2015 -RF -CCP. -HCV -HIV NL TSH 9-2016 +CRP in past 20 in 2009.  Past meloxicam. Piroxicam, methotrexate, ibuprofen. Allergy-sulfa (rash). Cortisone injections 3 in past, 2 weeks ago 6-27 +inflammation on aspiration (cloudy/yellow fluid Tot nuc cell 19,740, neutrophil 53%, lymp 24%, mono 23% and no crystals)  left knee. Prednisone-not tolerated (mood swings, tired) not improved diffuse pains only took " "for 3 days this was unsure mg this last was within 1 yr. NSAIDs no help. Undergoing for disability. Here with uncle. Records scanned (didnt have all rheum records) and care everywhere accessed.     Records from [place]--last OV 6-2017 (2015) and cervical reports .  Dx 10 y/o RA at [facility] [joint pain, weight loss, the eladio's apple, wrists], then seen Dr. Smith [facility], then Dr. Romero ([place]--cervical spine mobility issues, knee synovitis ). Dr. Babin (retired) her PCP took over then \"in and out of remission from her pregnancies\"     When she was young, joint pains [wrists, loosing weight, not able to move neck, eladio's apple 'was causing not able for eat\"]--sulfalalzine --allergy, then took methotrexate oral once a week (helped, and tolerated) and a 2nd medication but not sure thinks she was taking until was 18 yr old. Controlled and in remission, no flares. 2004 undergoing divorce, flexaril, tramadol, toradol and other not DMARD told this was not was RA was something else. Then seen Dr. Werner bansal fibromyalgia now undergoing at [place] this was a 2 day program June 2015. Got pregnant. last rheumatology in [place] Dr. Caldera, last appt 6- established in 2008 wishes to transfer care to here. Dr. Caldera ordered echo and PFT -not done will be done after pain management consult. No meds from Dr. Caldera Dx as \"widespread chronic pain\" told was no evidence of MITRA or scleroderma, f/u in 1 year and who referred to chronic pain management. Will be doing this at [place]. Last 7- who ordered PT. Will be meeting with pyschiatrist for possible bipolar.  The pain clinic- Will be doing PT 2x week, seeing psychiatry to go off the cymbalta as not helping (lyrica not helped), going on to zoloft. The PCP tried have failed for this pain. PCP ordered left knee injection 2 weeks ago, then Dr. Willis didn't feel this would help so Dr. Michael (PCP) did an injection    Diffuse pain, everyday " "widespread pain 7 /10, the worst in her how back then left knee, shoulders and base of the neck 9/10. Still hard to swallow. Pain has been progressively worse \"now its burning shoulders and down my back\", drop things, hands turn white, feels like her low back is going to collapse and \"crumble\" and the knee is \"jelly\", poor sleep. Mornings is the worst, some days its better as the day goes on. Most of the pain in neck shoulder, lowers back. Left knee and toes \"tingling blue and numb\". Left knee swells as times, when bend her fingers are sore and weak, achey are hard to bend back. Hands get cold and hard to feel then then the right 2-3 fingers turn white but her toes goes white then purple (tips of the fingers). Progressive symptoms since this past winter. Sensitive to the cold. +\"fishnet pattern on my legs\".     Muscles, joints all bother (mostly wrists, finger joints, left knee) , her SI bother +back pain when laying down not helped with cold heat stretching or walking, worse with movements. Low back always sore and tender, but sometime SI if touched. Meloxicam --stopped due to stomach issues (seen in ED). Denies tick bites or dactylitis.     Nothing has helped she reports. No recent travels     PMH:  Medical-anxiety, yeast, UTI, depression, ?bipolar--agreed was not after seen psych in 2013 turned out was ADHD now on adderall, dx JRA at youth,gestation diabetes x2, pre-eclampasia, influenza A in 2016   Surgical-None   No blood transfusions  Injury-tailbone fracture     FH:  No autoimmune disorders, psoriasis, UC, crohn's, SLE, RA, PsA, gout.  No MS or cancer in family  Mother-bipolar, paranoid schizoprhenia, mild MR  Father-Not known  Siblings-1/2 sister testing now anxiety depression fibromyalia, ADD and bipolar  Children-healthy      SH:  Rare Alcohol. + 1 PPDSmoking. No IVDU. 5 Children. Right handed.  Not working since Feb 2016     PMSH personally reviewed and updated by me.    ROS:  Negative hairloss, " "keratoconjunctivitis sicca, pleurisy, oral/nasal or ulcerations, inflammatory eye disease, inflammatory bowel disease, dactylitis, tenosynovitis, or enthespathy, blood clots, gout, psoriasis, UC, crohn's. No temporal headache, no jaw claudication, no scalp tenderness, vision changes, carotidynia, cough  +see above   +sun sensitivities (hot and skin red pattern and face \"butterfly race\", dizzy, threw-up)  +question if inflammatory bowel disease --went to ED (CT) working with PCP at this time   +miscarriages at 5-6 weeks   +bowels are diarrhea then constipation, no blood in stool nor ABD pain ok with gasX   CONSTITUTIONAL: No fevers, night sweats or unintentional weight change. No acute distress, swollen glands  EYES: No vision change, diplopia, pain in eyes or red eyes   EARS, NOSE, MOUTH, THROAT: No tinnitus or hearing change, no epistaxis or nasal discharge, no oral lesions, throat clear. Normal saliva pool.  No drymouth. No thyroid enlargement  CARDIOVASCULAR: No chest pain, palpitations, or pain with walking, no orthopnea or PND   RESPIRATORY: No dyspnea, cough, shortness of breath or wheezing. No pleurisy.   GI: No nausea, vomiting, diarrhea or constipation, no abdominal pain, or blood in stools.   : No change in urine, no dysuria or hematuria   MUSCKL:  No enthesitis, plantar fascitis or heel pain.   INTEGUMENTARY: No concerning lesions or moles   NEURO: No loss of strength or sensation, no numbness or tingling, no tremor, no dizziness, no headache. No falls   ENDO: No polyuria or polydipsia, no temperature intolerance   HEME/LYMPH:No concerning bumps, bleeding problems, or swollen lymph nodes.    Otherwise 14 point ROS obtained, reviewed and found negative.     Physical exam:  Vitals: Blood pressure (!) 137/96, pulse 93, height 1.473 m (4' 10\"), weight 69.9 kg (154 lb 3.2 oz), SpO2 100 %.  Wt Readings from Last 4 Encounters:   08/17/17 69.9 kg (154 lb 3.2 oz)   08/09/17 70.6 kg (155 lb 11.2 oz) "     Constitutional: [id number] cooperative  Eyes: nl EOM, PERRLA, vision, conjunctiva, sclera  ENT: nl external ears, nose, hearing, lips, teeth, gums, throat  Neck: no mass or thyroid enlargement  Resp: lungs clear to auscultation, nl to palpation, nl effort  CV: RRR, no murmurs, rubs or gallops, no edema  GI: no ABD mass or tenderness, no HSM  : not tested  Lymph: no cervical or epitrochlear nodes  MS: dusky fingers due to Raynauds, tender alone her spine, and multiple FM tender points. Shober's WNL. L knee with a moderate effusion and synovitis, normal ROM. Screaming out reporting low back pain with repositioning and hip exam, inconsistent with tested joint area and out of proportion to behavior outside of physical exam, no guarding. Mild hyperextension noted in wrists and elbows.    Skin: no nail pitting, alopecia, rash  Neuro:grossly non-focal.   Psych: nl judgement, orientation, memory, affect.      Labs/Imagin Neg chlamydia , gonorrhea, syphilis   HX Total Nuc Cells BF  2017  Comment:   Serous Fluid (Peritoneal, Pleural, Pericardial) Reference Range:  < 500 cells/mcL  Synovial Fluid Reference Range:  < 150 cells/mcL  BAL Reference Range:  No normal range has been established for total nucleated cell counts     CELLSMCL   HXSource BF      HXBody Fluid Color Yellow     HX Gross Appear BF Cloudy     Neutrophils, Body Fluid 53  Comment:   Serous Fluid (Peritoneal, Pleural, Pericardial) Reference Range:  < 25%  Synovial Fluid Reference Range:  < 25%  BAL Reference Range:  No normal range has been established for the differential.         HXLymphocytes BF 24  Comment:   Serous Fluid (Peritoneal, Pleural, Pericardial) Reference Range:  N/A  Synovial Fluid Reference Range:  < 75%  BAL Reference Range:  No normal range has been established for the differential.         HXMono/Macro BF 23  Comment:   Serous Fluid (Peritoneal, Pleural, Pericardial) Reference Range:  N/A  Synovial Fluid  Reference Range:  < 70%  BAL Reference Range:  No normal range has been established for the differential.        R Knee Bilat AP Std + Knee Lt 3 view  6/27/2017 16:12:10   Result Impression    Small effusion, otherwise negative left knee.    Result Narrative       EXAM: XR Knee Bilat AP Std + Knee Lt 3 view     INDICATION: knee pain, hx JRA     COMPARISON: Left knee of 6/8/2009.      FINDINGS: There is a small knee joint effusion. No fracture or   destructive lesion is identified. Knee joint spaces are preserved   without significant degenerative or erosive arthritis. Incidentally   visualized right knee on the AP and PA views is unremarkable.     Addendum by Provider, VAMSHI Freeman on 04/18/2017  8:41 AM  RAD^^^MA  CT Abdomen/Pelvis w/ contrast  4/18/2017 08:41:48   Result Narrative       Exam: CT of abdomen and pelvis     History:   Abdominal pain      Comparison: None     Technique: CT of the abdomen and pelvis with IV contrast and without   oral contrast. Images were obtained from base of lungs to below the   pelvis. The images were interpreted in bone, abdominal, and lung   windows.     Findings: Lung bases are clear. The upper abdominal organs are within   normal limits. There is no upper abdominal adenopathy. The upper   abdominal vasculature is patent. There is no free fluid in the pelvis.   The appendix is within normal limits. There is a dominant right   ovarian follicle. The genitourinary structures of the pelvis are   within normal limits. There are scattered diverticula of the colon.   There is a moderate amount of stool within the proximal colon. There   is no abnormally dilated loop of large or small bowel. There is a   small left renal stone. There is no obstructing nephro or   ureterolithiasis. There is no worrisome bone lesion.     Impression:    1. No acute abdominal pathology identified. Nonobstructing left renal   stone seen.   2. Mild diverticulosis with questionable descending colonic wall    thickening versus decompressed colon. Suggest correlation with   clinical symptoms.      Addendum by Pete Bravo M.D. on 07/30/2015 11:11 AM  RAD^^^MA  XR Cervical Spine 4 views  7/30/2015 11:11:32   Result Narrative       Exam:   XR Cervical Spine 4 or 5 views     Clinical history: JRA with abnormal MRI     Comparison: 5/11/15     Findings: The cervical spine vertebral body height and alignment is   preserved. The soft tissues are within normal limits. There is no   cortical lucency to suggest fracture. Flexion and extension views are   within normal limits.     Impression: No evidence of instability.      Stable cervical spine, no fracture or degenerative change.   Unchanged widening of the articulation of the odontoid and anterior   arch of C1.    Result Narrative   3-2017     EXAM: XR Cervical Spine 4 or 5 views     INDICATION: Neck pain, RA, MRI abnormality of Dens 2015     COMPARISON: 7/30/2015.      FINDINGS: Prevertebral soft tissues are unremarkable. The 7 cervical   vertebrae are well visualized and normal in height without evidence of   fracture or destructive lesions. Again noted is some widening of the   articulation of the odontoid and anterior arch of C1 but this is   stable and unchanged. Disc spaces are preserved without degenerative   change. Oblique views show normal facets and neural foramina   bilaterally.      MR Cervical Spine w/ + w/o contrast  7/22/2015 16:44:41Addendum by Pete Bravo M.D. on 07/22/2015  4:30 PM  RAD^^^MA  MR Cervical Spine w/ + w/o contrast  7/22/2015 16:30:00   Result Narrative       Multiecho and multiplanar views of of the cervical spine were   obtained prior to and following the IV administration of 7 mL of   contrast. This was compared to a previous study of 06/12/2015.     There is unchanged pannus formation surrounding the tip of the dens,   producing unchanged, 7 mm of widening between the anterior arch of C1   and the dens. The tip of the dens  is again noted to be eroded, as   well as erosion of a portion of the clivus. These findings are   stable.     There is no pathological enhancement. Again noted is congenital   narrowing of the spinal canal throughout the cervical spine. There is   mild unchanged disc bulging at the C4-5 level, with no evidence of   any significant spinal stenosis or neural foraminal stenosis. The   visualized brainstem appears normal.     Impression:   1. Unchanged pannus formation involving the C1 level as described   above, producing erosion of the tip of the dens as well as a portion   of the clivus.   2. Unchanged mild diffuse posterior disc bulging at the C4-5 level,   with no significant spinal stenosis or neural foraminal stenosis.   3. 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 Viral and rickettsial infections as well as trauma are also a possibility. RF/CCP negative. Will obtain knee MRI, screen for Lyme. Back pain in this setting is concerning for spondyloarthropathy, however it's difficult to assess symptoms from Chiqui's description, and Shober's is normal. Does have R SI sclerosis, but s/p two recent pregnancies, non-specific. May consider additional imaging of the lumbar spine with MRI to assess for early bone marrow edema. Will check HLA B27 as well.    She's had a recent steroid injection to the knee that has not helped. May consider systemic immunosuppressive therapy once work up is completed. Seems that NSAID's and MTX worked in the past. This would be a good first line therapy with a possible TNFi if the spine is involved. Xeljanz is a good option as well.    # Low positive scleroderma KARLA and Raynaud's agree with echo and PFTs screening, no objective signs of  systemic sclerosis at this point. Would monitor conservatively    # Fibromyalgia. Has follow up set up in pain clinic. We had an extensive discussion about the importance of physical conditioning over medications for this problem. She is taking flexeril TID, just stopped cymbalta and started effexor per her psychiatrist. I would consider adding amitriptyline at night time.    # Anxiety, bipolar, ADHD: mental health issues complicate Chiqui's care and suggest a poor prognosis for pain management. Mental health management is key at this point, encouraged to follow closely with the psychiatrist, consider CBT.     I discussed the findings and recommendations with the patient.  Recommendations were discussed with the consulting team.  Time spent: 90 minutes    Valerie Gamez MD, MS  Rheumatology Attending Physician  pgr [phone]        Valerie Gamez MD

## 2017-08-17 NOTE — MR AVS SNAPSHOT
After Visit Summary   8/17/2017    Chiqui Loyd    MRN: [id number]           Patient Information     Date Of Birth          1985        Visit Information        Provider Department      8/17/2017 3:00 PM Valerie Gamez MD [facility] Rheumatology        Today's Diagnoses     Synovitis of knee    -  1    Screening for condition           Follow-ups after your visit        Follow-up notes from your care team     Return in about 3 months (around 11/17/2017).      Your next 10 appointments already scheduled     Aug 17, 2017  4:30 PM CDT   Lab with  LAB   [facility] Lab ([facility])    [address] [place] [id number]   [phone]            Aug 24, 2017  2:30 PM CDT   FULL PULMONARY FUNCTION with  PFL D   [facility] Pulmonary Function Testing ([facility])    [address] [place] [id number]   [phone]            Aug 24, 2017  4:00 PM CDT   (Arrive by 3:45 PM)   MR KNEE LEFT W/O & W CONTRAST with [id number]   [facility] MRI ([facility])    [address] [place] [id number] [phone]           Take your medicines as usual, unless your doctor tells you not to. Bring a list of your current medicines to your exam (including vitamins, minerals and over-the-counter drugs).  You will be given intravenous contrast for this exam. To prepare:   The day before your exam, drink extra fluids at least six 8-ounce glasses (unless your doctor tells you to restrict your fluids).   Have a blood test (creatinine test) within 30 days of your exam. Go to your clinic or Diagnostic Imaging Department for this test.  The MRI machine uses a strong magnet. Please wear clothes without metal (snaps, zippers). A sweatsuit works well, or we may give you a hospital gown.  Please remove any body piercings and hair extensions before you arrive. You  will also remove watches, jewelry, hairpins, wallets, dentures, partial dental plates and hearing aids. You may wear contact lenses, and you may be able to wear your rings. We have a safe place to keep your personal items, but it is safer to leave them at home.   **IMPORTANT** THE INSTRUCTIONS BELOW ARE ONLY FOR THOSE PATIENTS WHO HAVE BEEN TOLD THEY WILL RECEIVE SEDATION OR GENERAL ANESTHESIA DURING THEIR MRI PROCEDURE:  IF YOU WILL RECEIVE SEDATION (take medicine to help you relax during your exam):   You must get the medicine from your doctor before you arrive. Bring the medicine to the exam. Do not take it at home.   Arrive one hour early. Bring someone who can take you home after the test. Your medicine will make you sleepy. After the exam, you may not drive, take a bus or take a taxi by yourself.   No eating 8 hours before your exam. You may have clear liquids up until 4 hours before your exam. (Clear liquids include water, clear tea, black coffee and fruit juice without pulp.)  IF YOU WILL RECEIVE ANESTHESIA (be asleep for your exam):   Arrive 1 1/2 hours early. Bring someone who can take you home after the test. You may not drive, take a bus or take a taxi by yourself.   No eating 8 hours before your exam. You may have clear liquids up until 4 hours before your exam. (Clear liquids include water, clear tea, black coffee and fruit juice without pulp.)  Please call the Imaging Department at your exam site with any questions.            Aug 24, 2017  5:00 PM CDT   Ech Complete with [id number]   [facility] Echo ([facility])    [address] [place] [id number] [phone]           1. Please bring or wear a comfortable two-piece outfit. 2. You may eat, drink and take your normal medicines. 3. For any questions that cannot be answered, please contact the ordering physician            Dec 07, 2017  4:30 PM CST   (Arrive by 4:15 PM)   Return Visit with Valerie  Ramiro Gamez MD   [facility] ([facility])    [address]  [address] [facility] [address] [phone]              Future tests that were ordered for you today     Open Future Orders        Priority Expected Expires Ordered    Vitamin D Deficiency Routine  8/17/2018 8/17/2017    MR Knee Left w/o & w Contrast Routine  8/17/2018 8/17/2017    General PFT Lab (Please always keep checked) Routine  8/17/2018 8/17/2017    Echocardiogram Complete Routine  8/17/2018 8/17/2017    TSH with free T4 reflex Routine  8/17/2018 8/17/2017            Who to contact     If you have questions or need follow up information about today's clinic visit or your schedule please contact [facility] RHEUMATOLOGY directly at [phone].  Normal or non-critical lab and imaging results will be communicated to you by [employer], letter or phone within 4 business days after the clinic has received the results. If you do not hear from us within 7 days, please contact the clinic through [employer] or phone. If you have a critical or abnormal lab result, we will notify you by phone as soon as possible.  Submit refill requests through [facility] or call your pharmacy and they will forward the refill request to us. Please allow 3 business days for your refill to be completed.          Additional Information About Your Visit        [facility] Information     [facility] gives you secure access to your electronic health record. If you see a primary care provider, you can also send messages to your care team and make appointments. If you have questions, please call your primary care clinic.  If you do not have a primary care provider, please call [phone] and they will assist you.        Care EveryWhere ID     This is your Care EveryWhere ID. This could be used by other organizations to access your [place] medical records  [id number]        Your Vitals Were     Pulse Height Pulse Oximetry BMI (Body Mass  "Index)          93 1.473 m (4' 10\") 100% 32.23 kg/m2         Blood Pressure from Last 3 Encounters:   08/17/17 (!) 137/96   08/09/17 123/80    Weight from Last 3 Encounters:   08/17/17 69.9 kg (154 lb 3.2 oz)   08/09/17 70.6 kg (155 lb 11.2 oz)               Primary Care Provider    None Specified       No primary provider on file.        Equal Access to Services     ALONDRA FRAZIER : Hadii aad ku hadasho Soomaali, waaxda luqadaha, qaybta kaalmada adeegyada, faviola bartonin ana lilia harding . So [facility] [phone].    ATENCIÓN: Si bandar zuñiga, tiene a mcghee disposición servicios gratuitos de asistencia lingüística. Llame al [phone].    We comply with applicable federal civil rights laws and [place] laws. We do not discriminate on the basis of race, color, national origin, age, disability sex, sexual orientation or gender identity.            Thank you!     Thank you for choosing [facility] RHEUMATOLOGY  for your care. Our goal is always to provide you with excellent care. Hearing back from our patients is one way we can continue to improve our services. Please take a few minutes to complete the written survey that you may receive in the mail after your visit with us. Thank you!             Your Updated Medication List - Protect others around you: Learn how to safely use, store and throw away your medicines at www.disposemymeds.org.          This list is accurate as of: 8/17/17  4:29 PM.  Always use your most recent med list.                   Brand Name Dispense Instructions for use Diagnosis    ACETAMINOPHEN PO      Take 325 mg by mouth every 4 hours as needed for pain        ADDERALL XR PO      Take 30 mg by mouth 2 times daily        B COMPLEX-FOLIC ACID ER PO       Synovitis of knee, Screening for condition       Biotin 5000 MCG Caps      Take 5,000 mcg by mouth daily        CETIRIZINE HCL PO      Take by mouth daily        CLONAZEPAM PO      Take 0.5 mg by mouth 2 times daily        CYCLOBENZAPRINE HCL " PO      Take 10 mg by mouth 3 times daily as needed for muscle spasms    Synovitis of knee, Screening for condition       etonogestrel 68 MG Impl    IMPLANON/NEXPLANON     1 each by Subdermal route once        fluticasone 50 MCG/ACT spray    FLONASE     Spray 1 spray into both nostrils 2 times daily        IBUPROFEN PO      Take 800 mg by mouth every 4 hours as needed for moderate pain        MELATONIN PO      Take 1 mg by mouth At Bedtime Take two tablets once a day at bedtime.        nicotine      Place onto the skin daily 1 patch, topical, once a day in the morning.        OMEPRAZOLE PO      Take 20 mg by mouth daily        PRENATAL PLUS PO      Take by mouth daily        SERTRALINE HCL PO      Take 100 mg by mouth daily Take 0.5 tab daily for 14 days, then 1 tab PO daily.        VITAMIN D (CHOLECALCIFEROL) PO      Take by mouth daily

## 2017-08-18 ENCOUNTER — TELEPHONE (OUTPATIENT)
Dept: RHEUMATOLOGY | Facility: CLINIC | Age: 32
End: 2017-08-18

## 2017-08-18 LAB — DEPRECATED CALCIDIOL+CALCIFEROL SERPL-MC: 47 UG/L (ref 20–75)

## 2017-08-18 NOTE — TELEPHONE ENCOUNTER
Pt returned call to clinic, discussed Dr. Gamez's plan to have an MRI of Spine, in addition to other tests.  Discussed other tests that are going to be done and answered all questions.  Set up appt for pt to be seen in 4 weeks in September.    Lisa Adkins RN  Rheumatology Clinic

## 2017-08-18 NOTE — TELEPHONE ENCOUNTER
----- Message from Valerie Gamez MD sent at 8/18/2017  9:11 AM CDT -----  Lisa,    I added an MRI of lumbar spine for Chiqui in addition to knee MRI. Can you please call her and help schedule both on same day?    I'd also like to see her back sooner than 3 months from now, shortly after MRI would be better - 3-4 weeks?

## 2017-08-18 NOTE — PROGRESS NOTES
"[facility] - Rheumatology Clinic Visit    # Polyarthralgias. 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 She is a difficult historian due to very labile mood: tearful throughout the appointment, screaming out in pain, states she is having trouble remembering things. Her current most concerning musculoskeletal issues are left knee pain and swelling and low back pain. It's difficult to say whether low back pain is inflammatory, Chiqui simply states \"I don't sleep and I constantly hurt\", \"I'm stiff all over\".  She is also prone to catastrophizing: \"my sugar was high again! Why is my sugar high?\" Bursts into tears. Daytime sugar was 101 with a normal A1c. And shows distrust of medical providers: \"the rheumatologist didn't even listen to me\"    HPI:    Chiqui Loyd  is a 32 year old here for possible CTD and +scleroderma KARLA. Previous dx juvenile rheumatoid arthritis (neck, wrists, left knee), +scleroderma low positive 1.6, raynauds and fibromyalgia.  6/2017 -JAMILAH,  -fior, -RNP,  -sm -ssa- ssb +low positive scl-70 1.6. 6/2015 -RF -CCP. -HCV -HIV NL TSH 9-2016 +CRP in past 20 in 2009.  Past meloxicam. Piroxicam, methotrexate, ibuprofen. Allergy-sulfa (rash). Cortisone injections 3 in past, 2 weeks ago 6-27 +inflammation on aspiration (cloudy/yellow fluid Tot nuc cell 19,740, neutrophil 53%, lymp 24%, mono 23% and no crystals)  left knee. Prednisone-not tolerated (mood swings, tired) not improved diffuse pains only took for 3 days this was unsure mg this last was within 1 yr. NSAIDs no help. Undergoing for " "disability. Here with uncle. Records scanned (didnt have all rheum records) and care everywhere accessed.     Records from [place]--last OV 6-2017 (2015) and cervical reports .  Dx 12 y/o RA at [facility] [joint pain, weight loss, the eladio's apple, wrists], then seen Dr. Smith [facility], then Dr. Romero ([place]--cervical spine mobility issues, knee synovitis ). Dr. Babin (retired) her PCP took over then \"in and out of remission from her pregnancies\"     When she was young, joint pains [wrists, loosing weight, not able to move neck, eladio's apple 'was causing not able for eat\"]--sulfalalzine --allergy, then took methotrexate oral once a week (helped, and tolerated) and a 2nd medication but not sure thinks she was taking until was 18 yr old. Controlled and in remission, no flares. 2004 undergoing divorce, flexaril, tramadol, toradol and other not DMARD told this was not was RA was something else. Then seen Dr. Werner bansal fibromyalgia now undergoing at [place] this was a 2 day program June 2015. Got pregnant. last rheumatology in [place] Dr. Caldera, last appt 6- established in 2008 wishes to transfer care to here. Dr. Caldera ordered echo and PFT -not done will be done after pain management consult. No meds from Dr. Caldera Dx as \"widespread chronic pain\" told was no evidence of MITRA or scleroderma, f/u in 1 year and who referred to chronic pain management. Will be doing this at [place]. Last 7- who ordered PT. Will be meeting with pyschiatrist for possible bipolar.  The pain clinic- Will be doing PT 2x week, seeing psychiatry to go off the cymbalta as not helping (lyrica not helped), going on to zoloft. The PCP tried have failed for this pain. PCP ordered left knee injection 2 weeks ago, then Dr. Willis didn't feel this would help so Dr. Michael (PCP) did an injection    Diffuse pain, everyday widespread pain 7 /10, the worst in her how back then left knee, shoulders and base of the neck " "9/10. Still hard to swallow. Pain has been progressively worse \"now its burning shoulders and down my back\", drop things, hands turn white, feels like her low back is going to collapse and \"crumble\" and the knee is \"jelly\", poor sleep. Mornings is the worst, some days its better as the day goes on. Most of the pain in neck shoulder, lowers back. Left knee and toes \"tingling blue and numb\". Left knee swells as times, when bend her fingers are sore and weak, achey are hard to bend back. Hands get cold and hard to feel then then the right 2-3 fingers turn white but her toes goes white then purple (tips of the fingers). Progressive symptoms since this past winter. Sensitive to the cold. +\"fishnet pattern on my legs\".     Muscles, joints all bother (mostly wrists, finger joints, left knee) , her SI bother +back pain when laying down not helped with cold heat stretching or walking, worse with movements. Low back always sore and tender, but sometime SI if touched. Meloxicam --stopped due to stomach issues (seen in ED). Denies tick bites or dactylitis.     Nothing has helped she reports. No recent travels     PMH:  Medical-anxiety, yeast, UTI, depression, ?bipolar--agreed was not after seen psych in 2013 turned out was ADHD now on adderall, dx JRA at youth,gestation diabetes x2, pre-eclampasia, influenza A in 2016   Surgical-None   No blood transfusions  Injury-tailbone fracture     FH:  No autoimmune disorders, psoriasis, UC, crohn's, SLE, RA, PsA, gout.  No MS or cancer in family  Mother-bipolar, paranoid schizoprhenia, mild MR  Father-Not known  Siblings-1/2 sister testing now anxiety depression fibromyalia, ADD and bipolar  Children-healthy      SH:  Rare Alcohol. + 1 PPDSmoking. No IVDU. 5 Children. Right handed.  Not working since Feb 2016     PMSH personally reviewed and updated by me.    ROS:  Negative hairloss, keratoconjunctivitis sicca, pleurisy, oral/nasal or ulcerations, inflammatory eye disease, inflammatory " "bowel disease, dactylitis, tenosynovitis, or enthespathy, blood clots, gout, psoriasis, UC, crohn's. No temporal headache, no jaw claudication, no scalp tenderness, vision changes, carotidynia, cough  +see above   +sun sensitivities (hot and skin red pattern and face \"butterfly race\", dizzy, threw-up)  +question if inflammatory bowel disease --went to ED (CT) working with PCP at this time   +miscarriages at 5-6 weeks   +bowels are diarrhea then constipation, no blood in stool nor ABD pain ok with gasX   CONSTITUTIONAL: No fevers, night sweats or unintentional weight change. No acute distress, swollen glands  EYES: No vision change, diplopia, pain in eyes or red eyes   EARS, NOSE, MOUTH, THROAT: No tinnitus or hearing change, no epistaxis or nasal discharge, no oral lesions, throat clear. Normal saliva pool.  No drymouth. No thyroid enlargement  CARDIOVASCULAR: No chest pain, palpitations, or pain with walking, no orthopnea or PND   RESPIRATORY: No dyspnea, cough, shortness of breath or wheezing. No pleurisy.   GI: No nausea, vomiting, diarrhea or constipation, no abdominal pain, or blood in stools.   : No change in urine, no dysuria or hematuria   MUSCKL:  No enthesitis, plantar fascitis or heel pain.   INTEGUMENTARY: No concerning lesions or moles   NEURO: No loss of strength or sensation, no numbness or tingling, no tremor, no dizziness, no headache. No falls   ENDO: No polyuria or polydipsia, no temperature intolerance   HEME/LYMPH:No concerning bumps, bleeding problems, or swollen lymph nodes.    Otherwise 14 point ROS obtained, reviewed and found negative.     Physical exam:  Vitals: Blood pressure (!) 137/96, pulse 93, height 1.473 m (4' 10\"), weight 69.9 kg (154 lb 3.2 oz), SpO2 100 %.  Wt Readings from Last 4 Encounters:   08/17/17 69.9 kg (154 lb 3.2 oz)   08/09/17 70.6 kg (155 lb 11.2 oz)     Constitutional: WH-[id number]-LF cooperative  Eyes: nl EOM, PERRLA, vision, conjunctiva, sclera  ENT: nl external " ears, nose, hearing, lips, teeth, gums, throat  Neck: no mass or thyroid enlargement  Resp: lungs clear to auscultation, nl to palpation, nl effort  CV: RRR, no murmurs, rubs or gallops, no edema  GI: no ABD mass or tenderness, no HSM  : not tested  Lymph: no cervical or epitrochlear nodes  MS: dusky fingers due to Raynauds, tender alone her spine, and multiple FM tender points. Shober's WNL. L knee with a moderate effusion and synovitis, normal ROM. Screaming out reporting low back pain with repositioning and hip exam, inconsistent with tested joint area and out of proportion to behavior outside of physical exam, no guarding. Mild hyperextension noted in wrists and elbows.    Skin: no nail pitting, alopecia, rash  Neuro:grossly non-focal.   Psych: nl judgement, orientation, memory, affect.      Labs/Imagin Neg chlamydia , gonorrhea, syphilis   HX Total Nuc Cells BF  2017  Comment:   Serous Fluid (Peritoneal, Pleural, Pericardial) Reference Range:  < 500 cells/mcL  Synovial Fluid Reference Range:  < 150 cells/mcL  BAL Reference Range:  No normal range has been established for total nucleated cell counts     CELLSMCL   HXSource BF      HXBody Fluid Color Yellow     HX Gross Appear BF Cloudy     Neutrophils, Body Fluid 53  Comment:   Serous Fluid (Peritoneal, Pleural, Pericardial) Reference Range:  < 25%  Synovial Fluid Reference Range:  < 25%  BAL Reference Range:  No normal range has been established for the differential.         HXLymphocytes BF 24  Comment:   Serous Fluid (Peritoneal, Pleural, Pericardial) Reference Range:  N/A  Synovial Fluid Reference Range:  < 75%  BAL Reference Range:  No normal range has been established for the differential.         HXMono/Macro BF 23  Comment:   Serous Fluid (Peritoneal, Pleural, Pericardial) Reference Range:  N/A  Synovial Fluid Reference Range:  < 70%  BAL Reference Range:  No normal range has been established for the differential.        R Knee  Bilat AP Std + Knee Lt 3 view  6/27/2017 16:12:10   Result Impression    Small effusion, otherwise negative left knee.    Result Narrative       EXAM: XR Knee Bilat AP Std + Knee Lt 3 view     INDICATION: knee pain, hx JRA     COMPARISON: Left knee of 6/8/2009.      FINDINGS: There is a small knee joint effusion. No fracture or   destructive lesion is identified. Knee joint spaces are preserved   without significant degenerative or erosive arthritis. Incidentally   visualized right knee on the AP and PA views is unremarkable.     Addendum by Provider, VAMSHI Freeman on 04/18/2017  8:41 AM  RAD^^^MA  CT Abdomen/Pelvis w/ contrast  4/18/2017 08:41:48   Result Narrative       Exam: CT of abdomen and pelvis     History:   Abdominal pain      Comparison: None     Technique: CT of the abdomen and pelvis with IV contrast and without   oral contrast. Images were obtained from base of lungs to below the   pelvis. The images were interpreted in bone, abdominal, and lung   windows.     Findings: Lung bases are clear. The upper abdominal organs are within   normal limits. There is no upper abdominal adenopathy. The upper   abdominal vasculature is patent. There is no free fluid in the pelvis.   The appendix is within normal limits. There is a dominant right   ovarian follicle. The genitourinary structures of the pelvis are   within normal limits. There are scattered diverticula of the colon.   There is a moderate amount of stool within the proximal colon. There   is no abnormally dilated loop of large or small bowel. There is a   small left renal stone. There is no obstructing nephro or   ureterolithiasis. There is no worrisome bone lesion.     Impression:    1. No acute abdominal pathology identified. Nonobstructing left renal   stone seen.   2. Mild diverticulosis with questionable descending colonic wall   thickening versus decompressed colon. Suggest correlation with   clinical symptoms.      Addendum by Provider,  VAMSHI Freeman on 07/30/2015 11:11 AM  RAD^^^MA  XR Cervical Spine 4 views  7/30/2015 11:11:32   Result Narrative       Exam:   XR Cervical Spine 4 or 5 views     Clinical history: JRA with abnormal MRI     Comparison: 5/11/15     Findings: The cervical spine vertebral body height and alignment is   preserved. The soft tissues are within normal limits. There is no   cortical lucency to suggest fracture. Flexion and extension views are   within normal limits.     Impression: No evidence of instability.      Stable cervical spine, no fracture or degenerative change.   Unchanged widening of the articulation of the odontoid and anterior   arch of C1.    Result Narrative   3-2017     EXAM: XR Cervical Spine 4 or 5 views     INDICATION: Neck pain, RA, MRI abnormality of Dens 2015     COMPARISON: 7/30/2015.      FINDINGS: Prevertebral soft tissues are unremarkable. The 7 cervical   vertebrae are well visualized and normal in height without evidence of   fracture or destructive lesions. Again noted is some widening of the   articulation of the odontoid and anterior arch of C1 but this is   stable and unchanged. Disc spaces are preserved without degenerative   change. Oblique views show normal facets and neural foramina   bilaterally.      MR Cervical Spine w/ + w/o contrast  7/22/2015 16:44:41Addendum by Provider, VAMSHI Freeman on 07/22/2015  4:30 PM  RAD^^^MA  MR Cervical Spine w/ + w/o contrast  7/22/2015 16:30:00   Result Narrative       Multiecho and multiplanar views of of the cervical spine were   obtained prior to and following the IV administration of 7 mL of   contrast. This was compared to a previous study of 06/12/2015.     There is unchanged pannus formation surrounding the tip of the dens,   producing unchanged, 7 mm of widening between the anterior arch of C1   and the dens. The tip of the dens is again noted to be eroded, as   well as erosion of a portion of the clivus. These findings are   stable.      There is no pathological enhancement. Again noted is congenital   narrowing of the spinal canal throughout the cervical spine. There is   mild unchanged disc bulging at the C4-5 level, with no evidence of   any significant spinal stenosis or neural foraminal stenosis. The   visualized brainstem appears normal.     Impression:   1. Unchanged pannus formation involving the C1 level as described   above, producing erosion of the tip of the dens as well as a portion   of the clivus.   2. Unchanged mild diffuse posterior disc bulging at the C4-5 level,   with no significant spinal stenosis or neural foraminal stenosis.   3. 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 Viral and rickettsial infections as well as trauma are also a possibility. RF/CCP negative. Will obtain knee MRI, screen for Lyme. Back pain in this setting is concerning for spondyloarthropathy, however it's difficult to assess symptoms from Chiqui's description, and Shober's is normal. Does have R SI sclerosis, but s/p two recent pregnancies, non-specific. May consider additional imaging of the lumbar spine with MRI to assess for early bone marrow edema. Will check HLA B27 as well.    She's had a recent steroid injection to the knee that has not helped. May consider systemic immunosuppressive therapy once work up is completed. Seems that NSAID's and MTX worked in the past. This would be a good first line therapy with a possible TNFi if the spine is involved. Xeljanz is a good option as well.    # Low positive scleroderma KARLA and Raynaud's agree with echo and PFTs screening, no objective signs of systemic sclerosis at this point. Would monitor conservatively    # Fibromyalgia. Has follow up set up in  pain clinic. We had an extensive discussion about the importance of physical conditioning over medications for this problem. She is taking flexeril TID, just stopped cymbalta and started effexor per her psychiatrist. I would consider adding amitriptyline at night time.    # Anxiety, bipolar, ADHD: mental health issues complicate Chiqui's care and suggest a poor prognosis for pain management. Mental health management is key at this point, encouraged to follow closely with the psychiatrist, consider CBT.     I discussed the findings and recommendations with the patient.  Recommendations were discussed with the consulting team.  Time spent: 90 minutes    Valerie Gamez MD, MS  Rheumatology Attending Physician  pgr [phone]

## 2017-08-22 ENCOUNTER — TELEPHONE (OUTPATIENT)
Dept: RHEUMATOLOGY | Facility: CLINIC | Age: 32
End: 2017-08-22

## 2017-08-22 NOTE — TELEPHONE ENCOUNTER
Pt saying she went to ED 8/12/17 for severely swollen, painful knee along w/ some back pain. Told them had Dr. Gamez appt, ED gave areli prednisone, tramadol, did nothing additional. Says this is different than that described in 8/18/17 Telephone Encounter. Requesting to speak w/ Lisa. Please advise at [phone]. Sent to HAMZAH Gonzalez.

## 2017-08-24 ENCOUNTER — HOSPITAL ENCOUNTER (OUTPATIENT)
Dept: MRI IMAGING | Facility: CLINIC | Age: 32
End: 2017-08-24
Attending: INTERNAL MEDICINE
Payer: COMMERCIAL

## 2017-08-24 ENCOUNTER — HOSPITAL ENCOUNTER (OUTPATIENT)
Dept: MRI IMAGING | Facility: CLINIC | Age: 32
Discharge: HOME | End: 2017-08-24
Attending: INTERNAL MEDICINE | Admitting: INTERNAL MEDICINE
Payer: COMMERCIAL

## 2017-08-24 ENCOUNTER — RADIANT APPOINTMENT (OUTPATIENT)
Dept: CARDIOLOGY | Facility: CLINIC | Age: 32
End: 2017-08-24
Attending: INTERNAL MEDICINE

## 2017-08-24 DIAGNOSIS — M65.969 SYNOVITIS OF KNEE: ICD-10-CM

## 2017-08-24 DIAGNOSIS — Z13.9 SCREENING FOR CONDITION: ICD-10-CM

## 2017-08-24 PROCEDURE — A9585 GADOBUTROL INJECTION: HCPCS | Performed by: INTERNAL MEDICINE

## 2017-08-24 PROCEDURE — 72148 MRI LUMBAR SPINE W/O DYE: CPT

## 2017-08-24 PROCEDURE — 73723 MRI JOINT LWR EXTR W/O&W/DYE: CPT | Mod: LT

## 2017-08-24 PROCEDURE — 25000128 H RX IP 250 OP 636: Performed by: INTERNAL MEDICINE

## 2017-08-24 RX ORDER — GADOBUTROL 604.72 MG/ML
7.5 INJECTION INTRAVENOUS ONCE
Status: COMPLETED | OUTPATIENT
Start: 2017-08-24 | End: 2017-08-24

## 2017-08-24 RX ADMIN — GADOBUTROL 7.5 ML: 604.72 INJECTION INTRAVENOUS at 17:36

## 2017-08-24 NOTE — TELEPHONE ENCOUNTER
Patient has been notified of provider's note and no questions at this time.   Rahel Freedman LPN

## 2017-08-28 ENCOUNTER — TELEPHONE (OUTPATIENT)
Dept: RHEUMATOLOGY | Facility: CLINIC | Age: 32
End: 2017-08-28

## 2017-08-28 DIAGNOSIS — M65.962 SYNOVITIS OF LEFT KNEE: Primary | ICD-10-CM

## 2017-08-28 RX ORDER — PREDNISONE 10 MG/1
30 TABLET ORAL DAILY
Qty: 21 TABLET | Refills: 0 | Status: SHIPPED | OUTPATIENT
Start: 2017-08-28 | End: 2017-09-14

## 2017-08-28 RX ORDER — ONDANSETRON 4 MG/1
4 TABLET, FILM COATED ORAL EVERY 8 HOURS PRN
Qty: 18 TABLET | Refills: 0 | Status: SHIPPED | OUTPATIENT
Start: 2017-08-28

## 2017-08-28 NOTE — TELEPHONE ENCOUNTER
Called and spoke with pt.  Swelling started last Thursday despite being on prednisone at 50 mg, and 24 hours after finishing prednisone, swelling had dramatically increased.  Pt states that knee stays bent, using a footstool to support knee when going to bathroom.  Pt states that knee feels tight, is at times purple in color, after using heating pad, is then a splotchy red color, knee alternates between purple and red.  Pt is not able to bend knee any further than it is.  Pt has tried tylenol, ibuprofen, and tramadol with no relief in pain or swelling.  Using ice makes the knee hurt.   Pt has not been able to eat, sleep or walk all weekend.      Pt is wondering what to do next?  Should she be on prednisone, does she need to be seen?  PCP has been contacted but is out the office.  Nurse at PCP clinic is deferring to rheumatology for what to do next.  Pt is also wondering if MRI results will be helpful.     Pt would like a return call today, as the knee is very painful.    Will route to provider for review and advice.    Lisa Adkins RN  Rheumatology Clinic

## 2017-08-28 NOTE — TELEPHONE ENCOUNTER
"Pt called back asking to speak w/ Lisa. Triage RN, after speaking w/ Lisa, told pt Lisa will call back if did not hear back soon from Dr. Gamez. Pt saying \"I can't live like this; worse than child birth.\" Asking if should go to ED, Triage RN told her if she feels she needs to, she should go, that rheum is doing everything it could to help her. Please advise ASAP. Sent to HAMZAH Gonzalez.  "

## 2017-08-28 NOTE — TELEPHONE ENCOUNTER
Patient requesting a call back from Lisa to follow up after an MRI of her knee that she had last week and to discuss on-going symptoms of pain and swelling in her knee. Said she was on prednisone and tramadol prescribed by her physician in [place], but took her last prednisone on Friday and now her knee is much more swollen and it is difficult for her to walk. Wondering if more prednisone is recommended or if she should follow up with her doctor in [place]. Please follow up with patient at [phone]. Message routed to rheumatology nurses.     Thony Benoit RN

## 2017-08-31 LAB
DLCOCOR-%PRED-PRE: 86 %
DLCOCOR-PRE: 19.27 ML/MIN/MMHG
DLCOUNC-%PRED-PRE: 86 %
DLCOUNC-PRE: 19.21 ML/MIN/MMHG
DLCOUNC-PRED: 22.24 ML/MIN/MMHG
ERV-%PRED-PRE: 71 %
ERV-PRE: 0.51 L
ERV-PRED: 0.71 L
EXPTIME-PRE: 5.6 SEC
FEF2575-%PRED-PRE: 103 %
FEF2575-PRE: 2.98 L/SEC
FEF2575-PRED: 2.88 L/SEC
FEFMAX-%PRED-PRE: 103 %
FEFMAX-PRE: 6.34 L/SEC
FEFMAX-PRED: 6.13 L/SEC
FEV1-%PRED-PRE: 91 %
FEV1-PRE: 2.2 L
FEV1FEV6-PRE: 90 %
FEV1FEV6-PRED: 85 %
FEV1FVC-PRE: 90 %
FEV1FVC-PRED: 86 %
FEV1SVC-PRE: 85 %
FEV1SVC-PRED: 78 %
FIFMAX-PRE: 2.95 L/SEC
FRCPLETH-%PRED-PRE: 66 %
FRCPLETH-PRE: 1.55 L
FRCPLETH-PRED: 2.33 L
FVC-%PRED-PRE: 87 %
FVC-PRE: 2.45 L
FVC-PRED: 2.79 L
IC-%PRED-PRE: 87 %
IC-PRE: 2.07 L
IC-PRED: 2.38 L
RVPLETH-%PRED-PRE: 86 %
RVPLETH-PRE: 1.03 L
RVPLETH-PRED: 1.19 L
TLCPLETH-%PRED-PRE: 92 %
TLCPLETH-PRE: 3.62 L
TLCPLETH-PRED: 3.93 L
VA-%PRED-PRE: 80 %
VA-PRE: 3.28 L
VC-%PRED-PRE: 83 %
VC-PRE: 2.58 L
VC-PRED: 3.09 L

## 2017-09-05 DIAGNOSIS — M65.962 SYNOVITIS OF LEFT KNEE: ICD-10-CM

## 2017-09-05 RX ORDER — PREDNISONE 10 MG/1
30 TABLET ORAL DAILY
Status: CANCELLED | OUTPATIENT
Start: 2017-09-05

## 2017-09-05 NOTE — TELEPHONE ENCOUNTER
Patient reported that the 30 mg of prednisone kept her up for 48 hours. Patient denies being delusional but, stated that she couldn't no longer function due to lack of sleep. She overslept missing her scheduled appointment with Dr. Gamez. Patient decrease prednisone, per self, and is currently taking 20 mg-has been taking 20 mg x 3 days. Patient denies being unable to sleep at 20 mg daily and denies fever, chills and vomiting. At this time, patient is asking to continue prednisone until she can be seen in clinic to have knee joint aspirated.  Writer will route to provider to review and advise.   Rahel Freedman LPN

## 2017-09-05 NOTE — TELEPHONE ENCOUNTER
"Pt called, says prednisone had kept her up for 40 hrs, made her \"delusional\" before finally falling asleep, inadvertently missed appt. Asking how she can reschedule another one to get knee drained. Also says she will soon run out of prednisone, knee will start to swelling if she does not take it. Please advise at [phone]. Sent to [employer].   "

## 2017-09-06 RX ORDER — PREDNISONE 20 MG/1
20 TABLET ORAL DAILY
Qty: 8 TABLET | Refills: 0 | Status: SHIPPED | OUTPATIENT
Start: 2017-09-06 | End: 2017-09-14

## 2017-09-06 NOTE — TELEPHONE ENCOUNTER
Unable to leave a voice message at number listed. Will try to reach patient at a later time.   Rahel Freedman LPN

## 2017-09-06 NOTE — TELEPHONE ENCOUNTER
Provided paged and ordered prednisone 20 mg daily x 8 days. Provider asked staff to stress the importance of patient making next appointment as provider will not prescribe anymore prednisone unless patient is seen in clinic. Patient stated understanding.   Rahel Freedman LPN

## 2017-09-14 ENCOUNTER — OFFICE VISIT (OUTPATIENT)
Dept: RHEUMATOLOGY | Facility: CLINIC | Age: 32
End: 2017-09-14
Attending: INTERNAL MEDICINE
Payer: COMMERCIAL

## 2017-09-14 VITALS
RESPIRATION RATE: 18 BRPM | HEART RATE: 102 BPM | TEMPERATURE: 98.2 F | BODY MASS INDEX: 32.62 KG/M2 | SYSTOLIC BLOOD PRESSURE: 158 MMHG | WEIGHT: 156.1 LBS | DIASTOLIC BLOOD PRESSURE: 83 MMHG

## 2017-09-14 DIAGNOSIS — M65.962 SYNOVITIS OF LEFT KNEE: ICD-10-CM

## 2017-09-14 DIAGNOSIS — M19.90 INFLAMMATORY ARTHRITIS: ICD-10-CM

## 2017-09-14 DIAGNOSIS — M19.90 INFLAMMATORY ARTHRITIS: Primary | ICD-10-CM

## 2017-09-14 DIAGNOSIS — M65.969 SYNOVITIS OF KNEE: ICD-10-CM

## 2017-09-14 DIAGNOSIS — Z79.899 HIGH RISK MEDICATIONS (NOT ANTICOAGULANTS) LONG-TERM USE: ICD-10-CM

## 2017-09-14 PROCEDURE — 99212 OFFICE O/P EST SF 10 MIN: CPT | Mod: 25,ZF

## 2017-09-14 PROCEDURE — 86803 HEPATITIS C AB TEST: CPT | Performed by: INTERNAL MEDICINE

## 2017-09-14 PROCEDURE — 36415 COLL VENOUS BLD VENIPUNCTURE: CPT | Performed by: INTERNAL MEDICINE

## 2017-09-14 PROCEDURE — 86480 TB TEST CELL IMMUN MEASURE: CPT | Performed by: INTERNAL MEDICINE

## 2017-09-14 PROCEDURE — 86618 LYME DISEASE ANTIBODY: CPT | Performed by: INTERNAL MEDICINE

## 2017-09-14 PROCEDURE — 87340 HEPATITIS B SURFACE AG IA: CPT | Performed by: INTERNAL MEDICINE

## 2017-09-14 RX ORDER — PREDNISONE 10 MG/1
TABLET ORAL
Qty: 90 TABLET | Refills: 1 | Status: SHIPPED | OUTPATIENT
Start: 2017-09-14

## 2017-09-14 RX ORDER — PREDNISONE 5 MG/1
TABLET ORAL
Qty: 90 TABLET | Refills: 1 | Status: SHIPPED | OUTPATIENT
Start: 2017-09-14

## 2017-09-14 RX ORDER — METHYLPREDNISOLONE ACETATE 40 MG/ML
40 INJECTION, SUSPENSION INTRA-ARTICULAR; INTRALESIONAL; INTRAMUSCULAR; SOFT TISSUE ONCE
Status: DISPENSED | OUTPATIENT
Start: 2017-09-14

## 2017-09-14 RX ORDER — LIDOCAINE HYDROCHLORIDE 10 MG/ML
10 INJECTION, SOLUTION EPIDURAL; INFILTRATION; INTRACAUDAL; PERINEURAL ONCE
Status: DISPENSED | OUTPATIENT
Start: 2017-09-14

## 2017-09-14 RX ORDER — FOLIC ACID 1 MG/1
1 TABLET ORAL DAILY
Qty: 90 TABLET | Refills: 3 | Status: SHIPPED | OUTPATIENT
Start: 2017-09-14

## 2017-09-14 RX ORDER — PREDNISONE 2.5 MG/1
TABLET ORAL
Qty: 90 TABLET | Refills: 1 | Status: SHIPPED | OUTPATIENT
Start: 2017-09-14 | End: 2018-09-03

## 2017-09-14 ASSESSMENT — PAIN SCALES - GENERAL: PAINLEVEL: SEVERE PAIN (6)

## 2017-09-14 NOTE — NURSING NOTE
"Chief Complaint   Patient presents with     RECHECK     Synovitis of the knee       Initial /83  Pulse 102  Temp 98.2  F (36.8  C) (Oral)  Resp 18  Wt 70.8 kg (156 lb 1.6 oz)  BMI 32.62 kg/m2 Estimated body mass index is 32.62 kg/(m^2) as calculated from the following:    Height as of 8/17/17: 1.473 m (4' 10\").    Weight as of this encounter: 70.8 kg (156 lb 1.6 oz).  Medication Reconciliation: complete   RIGO VEGA CMA      "

## 2017-09-14 NOTE — MR AVS SNAPSHOT
After Visit Summary   9/14/2017    Chiqui Loyd    MRN: [id number]           Patient Information     Date Of Birth          1985        Visit Information        Provider Department      9/14/2017 1:00 PM Valerie Gamez MD [facility] Rheumatology        Today's Diagnoses     Inflammatory arthritis    -  1    Synovitis of left knee        High risk medications (not anticoagulants) long-term use          Care Instructions    - Start methotrexate 4 tabs together once a WEEK. First labs due in 1 month, then every 3 months.  - Start folic acid supplement 1 mg every DAY.  - Taper prednisone as follows: 17.5 mg daily for 1 week, then 15 mg daily for 1 week, then 12.5 mg daily for 1 week, then 10 mg daily and stay on 10 until next visit. Can taper slower if pain worsens or increase to 20 mg for up to 5 days if the knee flares.           Follow-ups after your visit        Follow-up notes from your care team     Return in about 3 months (around 12/14/2017).      Your next 10 appointments already scheduled     Sep 14, 2017  2:30 PM CDT   Lab with  LAB   [facility] ([facility])    [address] [place] [id number] [phone]            Dec 07, 2017  4:30 PM CST   (Arrive by 4:15 PM)   Return Visit with Valerie Gamez MD   [facility] Rheumatology ([facility])    [address] [place] [id number] [phone]              Future tests that were ordered for you today     Open Future Orders        Priority Expected Expires Ordered    Albumin level Routine 12/14/2017 12/29/2017 9/14/2017    ALT Routine 12/14/2017 12/29/2017 9/14/2017    AST Routine 12/14/2017 12/29/2017 9/14/2017    CBC with platelets differential Routine 12/14/2017 12/29/2017 9/14/2017    Creatinine Routine 12/14/2017 12/29/2017 9/14/2017    Albumin level Routine 10/12/2017 10/27/2017 9/14/2017    ALT Routine  10/12/2017 10/27/2017 9/14/2017    AST Routine 10/12/2017 10/27/2017 9/14/2017    Creatinine Routine 10/12/2017 10/27/2017 9/14/2017    CBC with platelets differential Routine 10/12/2017 10/27/2017 9/14/2017    M Tuberculosis by Quantiferon Gold Routine  9/14/2018 9/14/2017    Hepatitis B surface antigen Routine  9/14/2018 9/14/2017    Hepatitis C antibody Routine  9/14/2018 9/14/2017    Cell count with differential fluid Routine  9/14/2018 9/14/2017    Fluid Culture Aerobic Bacterial Routine  9/14/2018 9/14/2017    Gram stain Routine  9/14/2018 9/14/2017    Crystal ID synovial fluid Routine  9/14/2018 9/14/2017            Who to contact     If you have questions or need follow up information about today's clinic visit or your schedule please contact [facility] RHEUMATOLOGY directly at [phone].  Normal or non-critical lab and imaging results will be communicated to you by [email or web address], letter or phone within 4 business days after the clinic has received the results. If you do not hear from us within 7 days, please contact the clinic through Vint or phone. If you have a critical or abnormal lab result, we will notify you by phone as soon as possible.  Submit refill requests through Vint or call your pharmacy and they will forward the refill request to us. Please allow 3 business days for your refill to be completed.          Additional Information About Your Visit        [email or web address] Information     Vint gives you secure access to your electronic health record. If you see a primary care provider, you can also send messages to your care team and make appointments. If you have questions, please call your primary care clinic.  If you do not have a primary care provider, please call [phone] and they will assist you.        Care EveryWhere ID     This is your Care EveryWhere ID. This could be used by other organizations to access your [facility] medical records  [id number]        Your Vitals Were     Pulse  Temperature Respirations BMI (Body Mass Index)          102 98.2  F (36.8  C) (Oral) 18 32.62 kg/m2         Blood Pressure from Last 3 Encounters:   09/14/17 158/83   08/17/17 (!) 137/96   08/09/17 123/80    Weight from Last 3 Encounters:   09/14/17 70.8 kg (156 lb 1.6 oz)   08/17/17 69.9 kg (154 lb 3.2 oz)   08/09/17 70.6 kg (155 lb 11.2 oz)              We Performed the Following     Fungus Culture, non-blood          Today's Medication Changes          These changes are accurate as of: 9/14/17  2:25 PM.  If you have any questions, ask your nurse or doctor.               Start taking these medicines.        Dose/Directions    folic acid 1 MG tablet   Commonly known as:  FOLVITE   Used for:  Inflammatory arthritis   Started by:  Valerie Gamez MD        Dose:  1 mg   Take 1 tablet (1 mg) by mouth daily   Quantity:  90 tablet   Refills:  3       methotrexate 2.5 MG tablet CHEMO   Used for:  Inflammatory arthritis   Started by:  Valerie Gamez MD        Dose:  10 mg   Take 4 tablets (10 mg) by mouth once a week Take 4 tablets (10mg) by mouth weekly.   Quantity:  16 tablet   Refills:  2         These medicines have changed or have updated prescriptions.        Dose/Directions    * predniSONE 20 MG tablet   Commonly known as:  DELTASONE   This may have changed:    - Another medication with the same name was added. Make sure you understand how and when to take each.  - Another medication with the same name was changed. Make sure you understand how and when to take each.   Used for:  Synovitis of left knee   Changed by:  Valerie Gamez MD        Dose:  20 mg   Take 1 tablet (20 mg) by mouth daily for 8 days   Quantity:  8 tablet   Refills:  0       * predniSONE 10 MG tablet   Commonly known as:  DELTASONE   This may have changed:    - how much to take  - how to take this  - when to take this  - additional instructions   Used for:  Synovitis of left knee   Changed by:  Franco  Valerie Rubio MD        Taper prednisone as follows: 17.5 mg daily for 1 week, then 15 mg daily for 1 week, then 12.5 mg daily for 1 week, then 10 mg daily and stay on 10 until next visit.   Quantity:  90 tablet   Refills:  1       * predniSONE 5 MG tablet   Commonly known as:  DELTASONE   This may have changed:  You were already taking a medication with the same name, and this prescription was added. Make sure you understand how and when to take each.   Used for:  Inflammatory arthritis   Changed by:  Valerie Gamez MD        Taper prednisone as follows: 17.5 mg daily for 1 week, then 15 mg daily for 1 week, then 12.5 mg daily for 1 week, then 10 mg daily and stay on 10 until next visit.   Quantity:  90 tablet   Refills:  1       * predniSONE 2.5 MG tablet   Commonly known as:  DELTASONE   This may have changed:  You were already taking a medication with the same name, and this prescription was added. Make sure you understand how and when to take each.   Used for:  Inflammatory arthritis   Changed by:  Valerie Gamez MD        Taper prednisone as follows: 17.5 mg daily for 1 week, then 15 mg daily for 1 week, then 12.5 mg daily for 1 week, then 10 mg daily and stay on 10 until next visit.   Quantity:  90 tablet   Refills:  1       * Notice:  This list has 4 medication(s) that are the same as other medications prescribed for you. Read the directions carefully, and ask your doctor or other care provider to review them with you.         Where to get your medicines      These medications were sent to [facility] [address] [place] [address] AT [address] [id number]    Hours:  24-hours Phone:  [phone]     folic acid 1 MG tablet    methotrexate 2.5 MG tablet CHEMO         Some of these will need a paper prescription and others can be bought over the counter.  Ask your nurse if you have questions.     Bring a paper  prescription for each of these medications     predniSONE 10 MG tablet    predniSONE 2.5 MG tablet    predniSONE 5 MG tablet                Primary Care Provider Office Phone # Fax #    Danielle Mendes [phone]       [facility]– [place] [address] [place] [address]        Equal Access to Services     MIGUEL FRAZIER : Hadii aad ku hadasho Soomaali, waaxda luqadaha, qaybta kaalmada adeegyada, waxay idiin hayaan adeeg khfelipewinter ladesiraen issa. So [facility] [phone].    ATENCIÓN: Si habla español, tiene a mcghee disposición servicios gratuitos de asistencia lingüística. [facility] [phone].    We comply with applicable federal civil rights laws and [place] laws. We do not discriminate on the basis of race, color, national origin, age, disability sex, sexual orientation or gender identity.            Thank you!     Thank you for choosing [facility] RHEUM[facility]OLOG[facility]  for your care. Our goal is always to provide you with excellent care. Hearing back from our patients is one way we can continue to improve our services. Please take a few minutes to complete the written survey that you may receive in the mail after your visit with us. Thank you!             Your Updated Medication List - Protect others around you: Learn how to safely use, store and throw away your medicines at www.disposemymeds.org.          This list is accurate as of: 9/14/17  2:25 PM.  Always use your most recent med list.                   Brand Name Dispense Instructions for use Diagnosis    ACETAMINOPHEN PO      Take 325 mg by mouth every 4 hours as needed for pain        ADDERALL XR PO      Take 30 mg by mouth 2 times daily        B COMPLEX-FOLIC ACID ER PO       Synovitis of knee, Screening for condition       Biotin 5000 MCG Caps      Take 5,000 mcg by mouth daily        CETIRIZINE HCL PO      Take by mouth daily        CLONAZEPAM PO      Take 0.5 mg by mouth 2 times daily        CYCLOBENZAPRINE HCL PO      Take 10 mg by mouth 3 times daily  as needed for muscle spasms    Synovitis of knee, Screening for condition       etonogestrel 68 MG Impl    IMPLANON/NEXPLANON     1 each by Subdermal route once        fluticasone 50 MCG/ACT spray    FLONASE     Spray 1 spray into both nostrils 2 times daily        folic acid 1 MG tablet    FOLVITE    90 tablet    Take 1 tablet (1 mg) by mouth daily    Inflammatory arthritis       IBUPROFEN PO      Take 800 mg by mouth every 4 hours as needed for moderate pain        MELATONIN PO      Take 1 mg by mouth At Bedtime Take two tablets once a day at bedtime.        methotrexate 2.5 MG tablet CHEMO     16 tablet    Take 4 tablets (10 mg) by mouth once a week Take 4 tablets (10mg) by mouth weekly.    Inflammatory arthritis       nicotine      Place onto the skin daily 1 patch, topical, once a day in the morning.        OMEPRAZOLE PO      Take 20 mg by mouth daily        ondansetron 4 MG tablet    ZOFRAN    18 tablet    Take 1 tablet (4 mg) by mouth every 8 hours as needed for nausea    Synovitis of left knee       * predniSONE 20 MG tablet    DELTASONE    8 tablet    Take 1 tablet (20 mg) by mouth daily for 8 days    Synovitis of left knee       * predniSONE 10 MG tablet    DELTASONE    90 tablet    Taper prednisone as follows: 17.5 mg daily for 1 week, then 15 mg daily for 1 week, then 12.5 mg daily for 1 week, then 10 mg daily and stay on 10 until next visit.    Synovitis of left knee       * predniSONE 5 MG tablet    DELTASONE    90 tablet    Taper prednisone as follows: 17.5 mg daily for 1 week, then 15 mg daily for 1 week, then 12.5 mg daily for 1 week, then 10 mg daily and stay on 10 until next visit.    Inflammatory arthritis       * predniSONE 2.5 MG tablet    DELTASONE    90 tablet    Taper prednisone as follows: 17.5 mg daily for 1 week, then 15 mg daily for 1 week, then 12.5 mg daily for 1 week, then 10 mg daily and stay on 10 until next visit.    Inflammatory arthritis       PRENATAL PLUS PO      Take by mouth  daily        SERTRALINE HCL PO      Take 100 mg by mouth daily Take 0.5 tab daily for 14 days, then 1 tab PO daily.        VITAMIN D (CHOLECALCIFEROL) PO      Take by mouth daily        * Notice:  This list has 4 medication(s) that are the same as other medications prescribed for you. Read the directions carefully, and ask your doctor or other care provider to review them with you.

## 2017-09-14 NOTE — PROGRESS NOTES
[facility] - Rheumatology Clinic Visit    # Polyarthralgias. 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 She is functioning quite well, now on 20 mg of prednisone daily, walking without a limp and is in good spirits. The knee remains mildly swollen and warm. She reports that her last knee steroid injection was in early July at [facility], hence to early to repeat injection today.     We discussed resuming MTX that's previously controlled her JRA well. She is not planning to have more children in the near future. Using Nexplanon for contraception. Drinks alcohol very rarely, and is willing to get surveillance labs every 3 months.        HPI:    Chiqui Loyd  is a 32 year old here for possible CTD and +scleroderma KARLA. Previous dx juvenile rheumatoid arthritis (neck, wrists, left knee), +scleroderma low positive 1.6, raynauds and fibromyalgia.  6/2017 -JAMILAH,  -fior, -RNP,  -sm -ssa- ssb +low positive scl-70 1.6. 6/2015 -RF -CCP. -HCV -HIV NL TSH 9-2016 +CRP in past 20 in 2009.  Past meloxicam. Piroxicam, methotrexate, ibuprofen. Allergy-sulfa (rash). Cortisone injections 3 in past, 2 weeks ago 6-27 +inflammation on aspiration (cloudy/yellow fluid Tot nuc cell 19,740, neutrophil 53%, lymp 24%, mono 23% and no crystals)  left knee. Prednisone-not tolerated (mood swings, tired) not improved diffuse pains only took for 3 days this was unsure mg this last was within 1 yr. NSAIDs no help. Undergoing for disability. Records scanned (didnt have  "all rheum records) and care everywhere accessed.     Records from [place]--last OV 6-2017 (2015) and cervical reports .  Dx 12 y/o RA at [facility] [joint pain, weight loss, the eladio's apple, wrists], then seen Dr. Smith [facility], then Dr. Romero ([place]--cervical spine mobility issues, knee synovitis ). Dr. Babin (retired) her PCP took over then \"in and out of remission from her pregnancies\"     When she was young, joint pains [wrists, loosing weight, not able to move neck, eladio's apple 'was causing not able for eat\"]--sulfalalzine --allergy, then took methotrexate oral once a week (helped, and tolerated) and a 2nd medication but not sure thinks she was taking until was 18 yr old. Controlled and in remission, no flares. 2004 undergoing divorce, flexaril, tramadol, toradol and other not DMARD told this was not was RA was something else. Then seen Dr. Caldera dx fibromyalgia now undergoing at [facility] this was a 2 day program June 2015. Got pregnant. last rheumatology in [place] Dr. Caldera, last appt 6- established in 2008 wishes to transfer care to here. Dr. Caldera ordered echo and PFT -not done will be done after pain management consult. No meds from Dr. Caldera Dx as \"widespread chronic pain\" told was no evidence of MITRA or scleroderma, f/u in 1 year and who referred to chronic pain management. Will be doing this at [place]. Last 7- who ordered PT. Will be meeting with pyschiatrist for possible bipolar.  The pain clinic- Will be doing PT 2x week, seeing psychiatry to go off the cymbalta as not helping (lyrica not helped), going on to zoloft. The PCP tried have failed for this pain. PCP ordered left knee injection 2 weeks ago, then Dr. Willis didn't feel this would help so Dr. Michael (PCP) did an injection    Diffuse pain, everyday widespread pain 7 /10, the worst in her how back then left knee, shoulders and base of the neck 9/10. Still hard to swallow. Pain has been progressively " "worse \"now its burning shoulders and down my back\", drop things, hands turn white, feels like her low back is going to collapse and \"crumble\" and the knee is \"jelly\", poor sleep. Mornings is the worst, some days its better as the day goes on. Most of the pain in neck shoulder, lowers back. Left knee and toes \"tingling blue and numb\". Left knee swells as times, when bend her fingers are sore and weak, achey are hard to bend back. Hands get cold and hard to feel then then the right 2-3 fingers turn white but her toes goes white then purple (tips of the fingers). Progressive symptoms since this past winter. Sensitive to the cold. +\"fishnet pattern on my legs\".     Muscles, joints all bother (mostly wrists, finger joints, left knee) , her SI bother +back pain when laying down not helped with cold heat stretching or walking, worse with movements. Low back always sore and tender, but sometime SI if touched. Meloxicam --stopped due to stomach issues (seen in ED). Denies tick bites or dactylitis.     Nothing has helped she reports. No recent travels     Visit 8/2017:  Chiqui was referred by NP Johan Becerra to establish physician follow up. She is a difficult historian due to very labile mood: tearful throughout the appointment, screaming out in pain, states she is having trouble remembering things. Her current most concerning musculoskeletal issues are left knee pain and swelling and low back pain. It's difficult to say whether low back pain is inflammatory, Chiqui simply states \"I don't sleep and I constantly hurt\", \"I'm stiff all over\".  She is also prone to catastrophizing: \"my sugar was high again! Why is my sugar high?\" Bursts into tears. Daytime sugar was 101 with a normal A1c. And shows distrust of medical providers: \"the rheumatologist didn't even listen to me\"    PMH:  Medical-anxiety, yeast, UTI, depression, ?bipolar--agreed was not after seen psych in 2013 turned out was ADHD now on adderall, dx JRA at " "youth,gestation diabetes x2, pre-eclampasia, influenza A in 2016   Surgical-None   No blood transfusions  Injury-tailbone fracture     FH:  No autoimmune disorders, psoriasis, UC, crohn's, SLE, RA, PsA, gout.  No MS or cancer in family  Mother-bipolar, paranoid schizoprhenia, mild MR  Father-Not known  Siblings-1/2 sister testing now anxiety depression fibromyalia, ADD and bipolar  Children-healthy      SH:  Rare Alcohol. + 1 PPDSmoking. No IVDU. 5 Children. Right handed.  Not working since Feb 2016     [facility] personally reviewed and updated by me.    ROS:  Negative hairloss, keratoconjunctivitis sicca, pleurisy, oral/nasal or ulcerations, inflammatory eye disease, inflammatory bowel disease, dactylitis, tenosynovitis, or enthespathy, blood clots, gout, psoriasis, UC, crohn's. No temporal headache, no jaw claudication, no scalp tenderness, vision changes, carotidynia, cough  +see above   +sun sensitivities (hot and skin red pattern and face \"butterfly race\", dizzy, threw-up)  +question if inflammatory bowel disease --went to ED (CT) working with PCP at this time   +miscarriages at 5-6 weeks   +bowels are diarrhea then constipation, no blood in stool nor ABD pain ok with gasX   CONSTITUTIONAL: No fevers, night sweats or unintentional weight change. No acute distress, swollen glands  EYES: No vision change, diplopia, pain in eyes or red eyes   EARS, NOSE, MOUTH, THROAT: No tinnitus or hearing change, no epistaxis or nasal discharge, no oral lesions, throat clear. Normal saliva pool.  No drymouth. No thyroid enlargement  CARDIOVASCULAR: No chest pain, palpitations, or pain with walking, no orthopnea or PND   RESPIRATORY: No dyspnea, cough, shortness of breath or wheezing. No pleurisy.   GI: No nausea, vomiting, diarrhea or constipation, no abdominal pain, or blood in stools.   : No change in urine, no dysuria or hematuria   MUSCKL:  No enthesitis, plantar fascitis or heel pain.   INTEGUMENTARY: No concerning lesions or " moles   NEURO: No loss of strength or sensation, no numbness or tingling, no tremor, no dizziness, no headache. No falls   ENDO: No polyuria or polydipsia, no temperature intolerance   HEME/LYMPH:No concerning bumps, bleeding problems, or swollen lymph nodes.    Otherwise 14 point ROS obtained, reviewed and found negative.     Physical exam:  Vitals: Blood pressure 158/83, pulse 102, temperature 98.2  F (36.8  C), temperature source Oral, resp. rate 18, weight 70.8 kg (156 lb 1.6 oz).  Wt Readings from Last 4 Encounters:   17 70.8 kg (156 lb 1.6 oz)   17 69.9 kg (154 lb 3.2 oz)   17 70.6 kg (155 lb 11.2 oz)     Constitutional: XL-[id number] cooperative  Eyes: nl EOM, PERRLA, vision, conjunctiva, sclera  ENT: nl external ears, nose, hearing, lips, teeth, gums, throat  Neck: no mass or thyroid enlargement  Resp: lungs clear to auscultation, nl to palpation, nl effort  CV: RRR, no murmurs, rubs or gallops, no edema  GI: no ABD mass or tenderness, no HSM  : not tested  Lymph: no cervical or epitrochlear nodes  MS:  tender alone her spine, and multiple FM tender points. Shober's WNL. L knee with a small-moderate effusion and mild synovitis, normal ROM. Mild hyperextension noted in wrists and elbows.    Skin: no nail pitting, alopecia, rash; + livedo reticularis over lower extremities  Neuro:grossly non-focal.   Psych: nl judgement, orientation, memory, affect.      Labs/Imagin Neg chlamydia , gonorrhea, syphilis   HX Total Nuc Cells BF  2017  Comment:   Serous Fluid (Peritoneal, Pleural, Pericardial) Reference Range:  < 500 cells/mcL  Synovial Fluid Reference Range:  < 150 cells/mcL  BAL Reference Range:  No normal range has been established for total nucleated cell counts     CELLSMCL   HXSource BF      HXBody Fluid Color Yellow     HX Gross Appear BF Cloudy     Neutrophils, Body Fluid 53  Comment:   Serous Fluid (Peritoneal, Pleural, Pericardial) Reference Range:  <  25%  Synovial Fluid Reference Range:  < 25%  BAL Reference Range:  No normal range has been established for the differential.         HXLymphocytes BF 24  Comment:   Serous Fluid (Peritoneal, Pleural, Pericardial) Reference Range:  N/A  Synovial Fluid Reference Range:  < 75%  BAL Reference Range:  No normal range has been established for the differential.         HXMono/Macro BF 23  Comment:   Serous Fluid (Peritoneal, Pleural, Pericardial) Reference Range:  N/A  Synovial Fluid Reference Range:  < 70%  BAL Reference Range:  No normal range has been established for the differential.        R Knee Bilat AP Std + Knee Lt 3 view  6/27/2017 16:12:10   Result Impression    Small effusion, otherwise negative left knee.    Result Narrative       EXAM: XR Knee Bilat AP Std + Knee Lt 3 view     INDICATION: knee pain, hx JRA     COMPARISON: Left knee of 6/8/2009.      FINDINGS: There is a small knee joint effusion. No fracture or   destructive lesion is identified. Knee joint spaces are preserved   without significant degenerative or erosive arthritis. Incidentally   visualized right knee on the AP and PA views is unremarkable.     Addendum by ProviderPete M.D. on 04/18/2017  8:41 AM  RAD^^^MA  CT Abdomen/Pelvis w/ contrast  4/18/2017 08:41:48   Result Narrative       Exam: CT of abdomen and pelvis     History:   Abdominal pain      Comparison: None     Technique: CT of the abdomen and pelvis with IV contrast and without   oral contrast. Images were obtained from base of lungs to below the   pelvis. The images were interpreted in bone, abdominal, and lung   windows.     Findings: Lung bases are clear. The upper abdominal organs are within   normal limits. There is no upper abdominal adenopathy. The upper   abdominal vasculature is patent. There is no free fluid in the pelvis.   The appendix is within normal limits. There is a dominant right   ovarian follicle. The genitourinary structures of the pelvis are   within  normal limits. There are scattered diverticula of the colon.   There is a moderate amount of stool within the proximal colon. There   is no abnormally dilated loop of large or small bowel. There is a   small left renal stone. There is no obstructing nephro or   ureterolithiasis. There is no worrisome bone lesion.     Impression:    1. No acute abdominal pathology identified. Nonobstructing left renal   stone seen.   2. Mild diverticulosis with questionable descending colonic wall   thickening versus decompressed colon. Suggest correlation with   clinical symptoms.      Addendum by Pete Bravo M.D. on 07/30/2015 11:11 AM  RAD^^^MA  XR Cervical Spine 4 views  7/30/2015 11:11:32   Result Narrative       Exam:   XR Cervical Spine 4 or 5 views     Clinical history: JRA with abnormal MRI     Comparison: 5/11/15     Findings: The cervical spine vertebral body height and alignment is   preserved. The soft tissues are within normal limits. There is no   cortical lucency to suggest fracture. Flexion and extension views are   within normal limits.     Impression: No evidence of instability.      Stable cervical spine, no fracture or degenerative change.   Unchanged widening of the articulation of the odontoid and anterior   arch of C1.    Result Narrative   3-2017     EXAM: XR Cervical Spine 4 or 5 views     INDICATION: Neck pain, RA, MRI abnormality of Dens 2015     COMPARISON: 7/30/2015.      FINDINGS: Prevertebral soft tissues are unremarkable. The 7 cervical   vertebrae are well visualized and normal in height without evidence of   fracture or destructive lesions. Again noted is some widening of the   articulation of the odontoid and anterior arch of C1 but this is   stable and unchanged. Disc spaces are preserved without degenerative   change. Oblique views show normal facets and neural foramina   bilaterally.      MR Cervical Spine w/ + w/o contrast  7/22/2015 16:44:41Addendum by Pete Bravo M.D. on  07/22/2015  4:30 PM  RAD^^^MA  MR Cervical Spine w/ + w/o contrast  7/22/2015 16:30:00   Result Narrative       Multiecho and multiplanar views of of the cervical spine were   obtained prior to and following the IV administration of 7 mL of   contrast. This was compared to a previous study of 06/12/2015.     There is unchanged pannus formation surrounding the tip of the dens,   producing unchanged, 7 mm of widening between the anterior arch of C1   and the dens. The tip of the dens is again noted to be eroded, as   well as erosion of a portion of the clivus. These findings are   stable.     There is no pathological enhancement. Again noted is congenital   narrowing of the spinal canal throughout the cervical spine. There is   mild unchanged disc bulging at the C4-5 level, with no evidence of   any significant spinal stenosis or neural foraminal stenosis. The   visualized brainstem appears normal.     Impression:   1. Unchanged pannus formation involving the C1 level as described   above, producing erosion of the tip of the dens as well as a portion   of the clivus.   2. Unchanged mild diffuse posterior disc bulging at the C4-5 level,   with no significant spinal stenosis or neural foraminal stenosis.   3. 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 RF/CCP negative. Knee MRI consistent with synovitis and inflammatory debrees in the joint, w/o signs of trauma/structural damage, Back MRI negative for active spondyloarthropathy/bone marrow edema, screen for Lyme negative. HLA B27 still not done - will need to reorder.    Doing quite well today, but remains on 20 mg of prednisone. NSAID's and MTX worked in the past. Upon discussion today, will resume methotrexate: 10  mg weekly with folate, with a plan to titrate up over the next 3 months as tolerated.    Taper prednisone 17.5-15-12.5-10 q weekly, stay on 10. Labs in 1 month and in 3 months.    # Low positive scleroderma KARLA and Raynaud's. Normal echo and PFTs screening, no objective signs of systemic sclerosis at this point. Would monitor conservatively.    # Fibromyalgia. Has follow up set up in pain clinic. We had an extensive discussion about the importance of physical conditioning over medications for this problem. She is taking flexeril TID, just stopped cymbalta and started effexor per her psychiatrist. Feels a lot better off cymbalta.     # Anxiety, bipolar, ADHD: mental health issues complicate Chiqui's care and suggest a poor prognosis for pain management. Mental health management is key at this point, encouraged to follow closely with the psychiatrist, consider CBT.     RTC in 3 months    I discussed the findings and recommendations with the patient.  Recommendations were discussed with the consulting team.  Time spent: 60 minutes    Valerie Gamez MD, MS  Rheumatology Attending Physician  pgr [phone]

## 2017-09-14 NOTE — PATIENT INSTRUCTIONS
- Start methotrexate 4 tabs together once a WEEK. First labs due in 1 month, then every 3 months.  - Start folic acid supplement 1 mg every DAY.  - Taper prednisone as follows: 17.5 mg daily for 1 week, then 15 mg daily for 1 week, then 12.5 mg daily for 1 week, then 10 mg daily and stay on 10 until next visit. Can taper slower if pain worsens or increase to 20 mg for up to 5 days if the knee flares.

## 2017-09-14 NOTE — NURSING NOTE
The following medication was NOT given:     MEDICATION:  Depo Medrol 40mg  ROUTE: Medication not given per provider  LOT #: [id number]  : InteraXon  EXPIRATION DATE: 09/2017  NDC#: 8618-4537-90   Was there drug waste? Yes  Amount of drug waste (mL): 1mL.  Reason for waste:  As per MD        The following medication was NOT given:     MEDICATION:  Lidocaine without epinephrine  ROUTE: Not given per provider  LOT #: [id number]  : [employer]  EXPIRATION DATE: 10/19  NDC#: 60339-733-11   Was there drug waste? Yes  Amount of drug waste (mL): 5mL.  Reason for waste:  As per MD Gaby Walker, [facility]  September 14, 2017

## 2017-09-14 NOTE — LETTER
9/14/2017      RE: Chiqui Loyd  [address]  [facility] [address]       [facility] - Rheumatology Clinic Visit    # Polyarthralgias. 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 She is functioning quite well, now on 20 mg of prednisone daily, walking without a limp and is in good spirits. The knee remains mildly swollen and warm. She reports that her last knee steroid injection was in early July at [facility], hence to early to repeat injection today.     We discussed resuming MTX that's previously controlled her JRA well. She is not planning to have more children in the near future. Using Nexplanon for contraception. Drinks alcohol very rarely, and is willing to get surveillance labs every 3 months.        HPI:    Chiqui Loyd  is a 32 year old here for possible CTD and +scleroderma KARLA. Previous dx juvenile rheumatoid arthritis (neck, wrists, left knee), +scleroderma low positive 1.6, raynauds and fibromyalgia.  6/2017 -JAMILAH,  -fior, -RNP,  -sm -ssa- ssb +low positive scl-70 1.6. 6/2015 -RF -CCP. -HCV -HIV NL TSH 9-2016 +CRP in past 20 in 2009.  Past meloxicam. Piroxicam, methotrexate, ibuprofen. Allergy-sulfa (rash). Cortisone injections 3 in past, 2 weeks ago 6-27 +inflammation on aspiration (cloudy/yellow fluid Tot nuc cell 19,740, neutrophil 53%, lymp 24%, mono 23% and no crystals)  left knee. Prednisone-not tolerated (mood swings, tired) not improved diffuse pains only took for 3 days this was unsure mg this last  "was within 1 yr. NSAIDs no help. Undergoing for disability. Records scanned (didnt have all rheum records) and care everywhere accessed.     Records from [place]--last OV 6-2017 (2015) and cervical reports .  Dx 12 y/o RA at [facility] [joint pain, weight loss, the eladio's apple, wrists], then seen Dr. Smith [facility], then Dr. Romero ([facility]--cervical spine mobility issues, knee synovitis ). Dr. Babin (retired) her PCP took over then \"in and out of remission from her pregnancies\"     When she was young, joint pains [wrists, loosing weight, not able to move neck, eladio's apple 'was causing not able for eat\"]--sulfalalzine --allergy, then took methotrexate oral once a week (helped, and tolerated) and a 2nd medication but not sure thinks she was taking until was 18 yr old. Controlled and in remission, no flares. 2004 undergoing divorce, flexaril, tramadol, toradol and other not DMARD told this was not was RA was something else. Then seen Dr. Caldera dx fibromyalgia now undergoing at [facility] this was a 2 day program June 2015. Got pregnant. last rheumatology in [place] Dr. Caldera, last appt 6- established in 2008 wishes to transfer care to here. Dr. Caldera ordered echo and PFT -not done will be done after pain management consult. No meds from Dr. Caldera Dx as \"widespread chronic pain\" told was no evidence of MITRA or scleroderma, f/u in 1 year and who referred to chronic pain management. Will be doing this at [facility]. Last 7- who ordered PT. Will be meeting with pyschiatrist for possible bipolar.  The pain clinic- Will be doing PT 2x week, seeing psychiatry to go off the cymbalta as not helping (lyrica not helped), going on to zoloft. The PCP tried have failed for this pain. PCP ordered left knee injection 2 weeks ago, then Dr. Willis didn't feel this would help so Dr. Michael (PCP) did an injection    Diffuse pain, everyday widespread pain 7 /10, the worst in her how back then left knee, " "shoulders and base of the neck 9/10. Still hard to swallow. Pain has been progressively worse \"now its burning shoulders and down my back\", drop things, hands turn white, feels like her low back is going to collapse and \"crumble\" and the knee is \"jelly\", poor sleep. Mornings is the worst, some days its better as the day goes on. Most of the pain in neck shoulder, lowers back. Left knee and toes \"tingling blue and numb\". Left knee swells as times, when bend her fingers are sore and weak, achey are hard to bend back. Hands get cold and hard to feel then then the right 2-3 fingers turn white but her toes goes white then purple (tips of the fingers). Progressive symptoms since this past winter. Sensitive to the cold. +\"fishnet pattern on my legs\".     Muscles, joints all bother (mostly wrists, finger joints, left knee) , her SI bother +back pain when laying down not helped with cold heat stretching or walking, worse with movements. Low back always sore and tender, but sometime SI if touched. Meloxicam --stopped due to stomach issues (seen in ED). Denies tick bites or dactylitis.     Nothing has helped she reports. No recent travels     Visit 8/2017:  Chiqui was referred by NP Johan Becerra to establish physician follow up. She is a difficult historian due to very labile mood: tearful throughout the appointment, screaming out in pain, states she is having trouble remembering things. Her current most concerning musculoskeletal issues are left knee pain and swelling and low back pain. It's difficult to say whether low back pain is inflammatory, Chiqui simply states \"I don't sleep and I constantly hurt\", \"I'm stiff all over\".  She is also prone to catastrophizing: \"my sugar was high again! Why is my sugar high?\" Bursts into tears. Daytime sugar was 101 with a normal A1c. And shows distrust of medical providers: \"the rheumatologist didn't even listen to me\"    PMH:  Medical-anxiety, yeast, UTI, depression, ?bipolar--agreed was " "not after seen psych in 2013 turned out was ADHD now on adderall, dx JRA at youth,gestation diabetes x2, pre-eclampasia, influenza A in 2016   Surgical-None   No blood transfusions  Injury-tailbone fracture     FH:  No autoimmune disorders, psoriasis, UC, crohn's, SLE, RA, PsA, gout.  No MS or cancer in family  Mother-bipolar, paranoid schizoprhenia, mild MR  Father-Not known  Siblings-1/2 sister testing now anxiety depression fibromyalia, ADD and bipolar  Children-healthy      SH:  Rare Alcohol. + 1 PPDSmoking. No IVDU. 5 Children. Right handed.  Not working since Feb 2016     [facility] personally reviewed and updated by me.    ROS:  Negative hairloss, keratoconjunctivitis sicca, pleurisy, oral/nasal or ulcerations, inflammatory eye disease, inflammatory bowel disease, dactylitis, tenosynovitis, or enthespathy, blood clots, gout, psoriasis, UC, crohn's. No temporal headache, no jaw claudication, no scalp tenderness, vision changes, carotidynia, cough  +see above   +sun sensitivities (hot and skin red pattern and face \"butterfly race\", dizzy, threw-up)  +question if inflammatory bowel disease --went to ED (CT) working with PCP at this time   +miscarriages at 5-6 weeks   +bowels are diarrhea then constipation, no blood in stool nor ABD pain ok with gasX   CONSTITUTIONAL: No fevers, night sweats or unintentional weight change. No acute distress, swollen glands  EYES: No vision change, diplopia, pain in eyes or red eyes   EARS, NOSE, MOUTH, THROAT: No tinnitus or hearing change, no epistaxis or nasal discharge, no oral lesions, throat clear. Normal saliva pool.  No drymouth. No thyroid enlargement  CARDIOVASCULAR: No chest pain, palpitations, or pain with walking, no orthopnea or PND   RESPIRATORY: No dyspnea, cough, shortness of breath or wheezing. No pleurisy.   GI: No nausea, vomiting, diarrhea or constipation, no abdominal pain, or blood in stools.   : No change in urine, no dysuria or hematuria   MUSCKL:  No " enthesitis, plantar fascitis or heel pain.   INTEGUMENTARY: No concerning lesions or moles   NEURO: No loss of strength or sensation, no numbness or tingling, no tremor, no dizziness, no headache. No falls   ENDO: No polyuria or polydipsia, no temperature intolerance   HEME/LYMPH:No concerning bumps, bleeding problems, or swollen lymph nodes.    Otherwise 14 point ROS obtained, reviewed and found negative.     Physical exam:  Vitals: Blood pressure 158/83, pulse 102, temperature 98.2  F (36.8  C), temperature source Oral, resp. rate 18, weight 70.8 kg (156 lb 1.6 oz).  Wt Readings from Last 4 Encounters:   17 70.8 kg (156 lb 1.6 oz)   17 69.9 kg (154 lb 3.2 oz)   17 70.6 kg (155 lb 11.2 oz)     Constitutional: SR-[id number] cooperative  Eyes: nl EOM, PERRLA, vision, conjunctiva, sclera  ENT: nl external ears, nose, hearing, lips, teeth, gums, throat  Neck: no mass or thyroid enlargement  Resp: lungs clear to auscultation, nl to palpation, nl effort  CV: RRR, no murmurs, rubs or gallops, no edema  GI: no ABD mass or tenderness, no HSM  : not tested  Lymph: no cervical or epitrochlear nodes  MS:  tender alone her spine, and multiple FM tender points. Shober's WNL. L knee with a small-moderate effusion and mild synovitis, normal ROM. Mild hyperextension noted in wrists and elbows.    Skin: no nail pitting, alopecia, rash; + livedo reticularis over lower extremities  Neuro:grossly non-focal.   Psych: nl judgement, orientation, memory, affect.      Labs/Imagin Neg chlamydia , gonorrhea, syphilis   HX Total Nuc Cells BF  2017  Comment:   Serous Fluid (Peritoneal, Pleural, Pericardial) Reference Range:  < 500 cells/mcL  Synovial Fluid Reference Range:  < 150 cells/mcL  BAL Reference Range:  No normal range has been established for total nucleated cell counts     CELLSMCL   HXSource BF      HXBody Fluid Color Yellow     HX Gross Appear BF Cloudy     Neutrophils, Body Fluid  53  Comment:   Serous Fluid (Peritoneal, Pleural, Pericardial) Reference Range:  < 25%  Synovial Fluid Reference Range:  < 25%  BAL Reference Range:  No normal range has been established for the differential.         HXLymphocytes BF 24  Comment:   Serous Fluid (Peritoneal, Pleural, Pericardial) Reference Range:  N/A  Synovial Fluid Reference Range:  < 75%  BAL Reference Range:  No normal range has been established for the differential.         HXMono/Macro BF 23  Comment:   Serous Fluid (Peritoneal, Pleural, Pericardial) Reference Range:  N/A  Synovial Fluid Reference Range:  < 70%  BAL Reference Range:  No normal range has been established for the differential.        R Knee Bilat AP Std + Knee Lt 3 view  6/27/2017 16:12:10   Result Impression    Small effusion, otherwise negative left knee.    Result Narrative       EXAM: XR Knee Bilat AP Std + Knee Lt 3 view     INDICATION: knee pain, hx JRA     COMPARISON: Left knee of 6/8/2009.      FINDINGS: There is a small knee joint effusion. No fracture or   destructive lesion is identified. Knee joint spaces are preserved   without significant degenerative or erosive arthritis. Incidentally   visualized right knee on the AP and PA views is unremarkable.     Addendum by ProviderPete M.D. on 04/18/2017  8:41 AM  RAD^^^MA  CT Abdomen/Pelvis w/ contrast  4/18/2017 08:41:48   Result Narrative       Exam: CT of abdomen and pelvis     History:   Abdominal pain      Comparison: None     Technique: CT of the abdomen and pelvis with IV contrast and without   oral contrast. Images were obtained from base of lungs to below the   pelvis. The images were interpreted in bone, abdominal, and lung   windows.     Findings: Lung bases are clear. The upper abdominal organs are within   normal limits. There is no upper abdominal adenopathy. The upper   abdominal vasculature is patent. There is no free fluid in the pelvis.   The appendix is within normal limits. There is a dominant  right   ovarian follicle. The genitourinary structures of the pelvis are   within normal limits. There are scattered diverticula of the colon.   There is a moderate amount of stool within the proximal colon. There   is no abnormally dilated loop of large or small bowel. There is a   small left renal stone. There is no obstructing nephro or   ureterolithiasis. There is no worrisome bone lesion.     Impression:    1. No acute abdominal pathology identified. Nonobstructing left renal   stone seen.   2. Mild diverticulosis with questionable descending colonic wall   thickening versus decompressed colon. Suggest correlation with   clinical symptoms.      Addendum by Provider, VAMSHI Freeman on 07/30/2015 11:11 AM  RAD^^^MA  XR Cervical Spine 4 views  7/30/2015 11:11:32   Result Narrative       Exam:   XR Cervical Spine 4 or 5 views     Clinical history: JRA with abnormal MRI     Comparison: 5/11/15     Findings: The cervical spine vertebral body height and alignment is   preserved. The soft tissues are within normal limits. There is no   cortical lucency to suggest fracture. Flexion and extension views are   within normal limits.     Impression: No evidence of instability.      Stable cervical spine, no fracture or degenerative change.   Unchanged widening of the articulation of the odontoid and anterior   arch of C1.    Result Narrative   3-2017     EXAM: XR Cervical Spine 4 or 5 views     INDICATION: Neck pain, RA, MRI abnormality of Dens 2015     COMPARISON: 7/30/2015.      FINDINGS: Prevertebral soft tissues are unremarkable. The 7 cervical   vertebrae are well visualized and normal in height without evidence of   fracture or destructive lesions. Again noted is some widening of the   articulation of the odontoid and anterior arch of C1 but this is   stable and unchanged. Disc spaces are preserved without degenerative   change. Oblique views show normal facets and neural foramina   bilaterally.      MR Cervical  Spine w/ + w/o contrast  7/22/2015 16:44:41Addendum by Provider, VAMSHI Freeman on 07/22/2015  4:30 PM  RAD^^^MA  MR Cervical Spine w/ + w/o contrast  7/22/2015 16:30:00   Result Narrative       Multiecho and multiplanar views of of the cervical spine were   obtained prior to and following the IV administration of 7 mL of   contrast. This was compared to a previous study of 06/12/2015.     There is unchanged pannus formation surrounding the tip of the dens,   producing unchanged, 7 mm of widening between the anterior arch of C1   and the dens. The tip of the dens is again noted to be eroded, as   well as erosion of a portion of the clivus. These findings are   stable.     There is no pathological enhancement. Again noted is congenital   narrowing of the spinal canal throughout the cervical spine. There is   mild unchanged disc bulging at the C4-5 level, with no evidence of   any significant spinal stenosis or neural foraminal stenosis. The   visualized brainstem appears normal.     Impression:   1. Unchanged pannus formation involving the C1 level as described   above, producing erosion of the tip of the dens as well as a portion   of the clivus.   2. Unchanged mild diffuse posterior disc bulging at the C4-5 level,   with no significant spinal stenosis or neural foraminal stenosis.   3. 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 RF/CCP negative. Knee MRI consistent with synovitis and inflammatory debrees in the joint, w/o signs of trauma/structural damage, Back MRI negative for active spondyloarthropathy/bone marrow edema, screen for Lyme negative. HLA B27 still not done - will need to reorder.    Doing quite well today, but remains on 20 mg of prednisone.  NSAID's and MTX worked in the past. Upon discussion today, will resume methotrexate: 10 mg weekly with folate, with a plan to titrate up over the next 3 months as tolerated.    Taper prednisone 17.5-15-12.5-10 q weekly, stay on 10. Labs in 1 month and in 3 months.    # Low positive scleroderma KARLA and Raynaud's. Normal echo and PFTs screening, no objective signs of systemic sclerosis at this point. Would monitor conservatively.    # Fibromyalgia. Has follow up set up in pain clinic. We had an extensive discussion about the importance of physical conditioning over medications for this problem. She is taking flexeril TID, just stopped cymbalta and started effexor per her psychiatrist. Feels a lot better off cymbalta.     # Anxiety, bipolar, ADHD: mental health issues complicate Chiqui's care and suggest a poor prognosis for pain management. Mental health management is key at this point, encouraged to follow closely with the psychiatrist, consider CBT.     RTC in 3 months    I discussed the findings and recommendations with the patient.  Recommendations were discussed with the consulting team.  Time spent: 60 minutes    Valerie Gamez MD, MS  Rheumatology Attending Physician  pgr [phone]

## 2017-09-15 LAB
B BURGDOR IGG+IGM SER QL: 0.03 (ref 0–0.89)
HBV SURFACE AG SERPL QL IA: NONREACTIVE
HCV AB SERPL QL IA: NONREACTIVE

## 2017-09-18 LAB
FUNGUS SPEC CULT: NORMAL
FUNGUS SPEC CULT: NORMAL
SPECIMEN SOURCE: NORMAL

## 2017-09-19 LAB
M TB TUBERC IFN-G BLD QL: NEGATIVE
M TB TUBERC IFN-G/MITOGEN IGNF BLD: 0.04 IU/ML

## 2017-11-02 ENCOUNTER — TELEPHONE (OUTPATIENT)
Dept: RHEUMATOLOGY | Facility: CLINIC | Age: 32
End: 2017-11-02

## 2017-11-02 NOTE — TELEPHONE ENCOUNTER
Pt LVM re: written orders. Supposed to take to [facility], they are not sure how to run through their system, appears it was down at the time. Pt not sure how she should get labs done. Also following up on 11/1/17 call re: trouble sleeping. Can be reached at [phone]. Sent to [facility].

## 2017-11-16 NOTE — TELEPHONE ENCOUNTER
Pt called to request labs to be faxed to [facility] in [place]. Pt will be in [facility] today at 2:30pm.     [facility] clinic can be called at [phone].    Pt can be called at [phone].

## 2017-11-17 NOTE — TELEPHONE ENCOUNTER
Patient reported that she didn't request labs to be faxed. She wanted to update nursing staff that labs were completed 11/16/2017and they'd be faxed to the clinic in the near future.   Rahel Freedman LPN

## 2017-11-20 ENCOUNTER — MYC MEDICAL ADVICE (OUTPATIENT)
Dept: RHEUMATOLOGY | Facility: CLINIC | Age: 32
End: 2017-11-20

## 2017-11-20 NOTE — TELEPHONE ENCOUNTER
Pt called to check on receipt of lab results fax from [facility] on 11/16 or 11/17.     Please callback pt at [phone].

## 2017-11-21 NOTE — TELEPHONE ENCOUNTER
Additional message related to previous message that has been addressed. I will close encounter at this time.   Rahel Freedman LPN

## 2017-11-22 NOTE — TELEPHONE ENCOUNTER
Writer has responded to patient via [facility] encounters created, will close encounter as no further action is needed.   Rahel Freedman LPN

## 2017-12-11 LAB
ALT SERPL-CCNC: 17 U/L (ref 7–45)
AST SERPL-CCNC: 19 U/L (ref 8–43)
BASOPHILS NFR BLD AUTO: 0.01 % (ref 0.01–0.08)
EOSINOPHIL NFR BLD AUTO: 0.02 % (ref 0.03–0.48)
ERYTHROCYTE [DISTWIDTH] IN BLOOD BY AUTOMATED COUNT: 12.4 % (ref 12.2–16.1)
HCT VFR BLD AUTO: 41.2 % (ref 35.5–44.9)
HEMOGLOBIN: 14.2 G/DL (ref 11.6–15)
LYMPHOCYTES NFR BLD AUTO: 1.29 % (ref 0.95–3.07)
MCV RBC AUTO: 96 FL (ref 78.2–97.9)
MONOCYTES NFR BLD AUTO: 0.33 % (ref 0.26–0.81)
NEUTROPHILS NFR BLD AUTO: 11.39 % (ref 1.56–6.45)
PLATELET COUNT - QUEST: 203 10^9/L (ref 157–371)
RBC # BLD AUTO: 4.29 10^12/L (ref 3.92–5.13)
WBC # BLD AUTO: 13 10^9/L (ref 3.4–9.6)

## 2017-12-12 DIAGNOSIS — M19.90 INFLAMMATORY ARTHRITIS: ICD-10-CM

## 2017-12-12 DIAGNOSIS — Z79.899 ENCOUNTER FOR LONG-TERM (CURRENT) USE OF MEDICATIONS: Primary | ICD-10-CM

## 2017-12-12 DIAGNOSIS — M71.22 BAKER'S CYST OF KNEE, LEFT: ICD-10-CM

## 2017-12-12 DIAGNOSIS — M79.7 FIBROMYALGIA: ICD-10-CM

## 2017-12-12 NOTE — LETTER
Date: 2018    Chiqui Loyd  [address]  [facility] [address]      MRN: [id number]  : 1985    Dx:    ICD-10-CM    1. Encounter for long-term (current) use of medications Z79.899 Albumin level     ALT     AST     CBC with platelets differential      Creatinine         2. Inflammatory arthritis         Orders Placed This Encounter     Albumin level     Standing Status:   Future     Standing Expiration Date:   2018     ALT     Standing Status:   Future     Standing Expiration Date:   2018     AST     Standing Status:   Future     Standing Expiration Date:   2018     CBC with platelets differential     Standing Status:   Future     Standing Expiration Date:   2018     Creatinine     Standing Status:   Future     Standing Expiration Date:   2018     ORTHOPEDICS ADULT REFERRAL     Referral Type:   Consultation       Fax results to [phone]    Sincerely,    Valerie Gamez MD   [facility]  Adult Rheumatology  [phone]

## 2017-12-12 NOTE — TELEPHONE ENCOUNTER
----- Message from Bijal Brooks sent at 12/11/2017  4:40 PM CST -----  Regarding: Franco pt--f/u needed  Contact: [phone]  Good afternoon,    Pt sees Franco for inflammatory arth and fibromyalgia, according to pt. She states she had requested an appt but was scheduled out in March; pt is wanting to be seen sooner.  Pt states she had had to cancel her last appt due to her daughter having an allergic reaction to a med. Pt states her left knee has been swollen and painful, she has had difficulty walking. She also states she has a baker's cyst on the back of her left knee, and it was to have been drained and given a cortisone shot, but that was not able to happen as she had recently had one.   Pt is very distressed about her leg and her unmanaged fibromyalgia pain; she states it is not under control and she is unsure what to do. Lastly, she mentioned that she is off prednisone completely, and the methyltrexate has not been helping. Please advise when available.    Thank you!    Bijal    Please DO NOT send this message and/or reply back to sender. Call Center Representatives DO NOT respond to messages.

## 2017-12-13 NOTE — TELEPHONE ENCOUNTER
"Pt is c/o of significant pain and fatigue.  Unable to leave house due to knee being unreliable.  Knee hurts, feels like it is \"bending the other way\".  Pt is using flexaril for fibromyalgia, has been using for combination of fibromyalgia and arthritis pain.  Takes the edge off.      Pt went to PCP, who stated that rheumatology should be filling.  Flexaril 10 mg tablets, take one tablet 3x daily as needed for muscle spasms.  Pt did pain clinic one time, but was told they don't prescribe medication so she stopped going.  Dr. Perez, PCP, told her not to go as they were telling her to do physical therapy, so until knee is fixed.      Pt is currently on 10 mg a week of Methotrexate.      Pt is off prednisone at this time.      Pt has had a sinus infection, was given antibiotics, didn't get better, saw PCP on 11/29.  Pt was told that it could take a little time to get better.     Pt feels rubbing in the knee and it is very painful. Pt states that tylenol and ibuprofen does not help, and did have stomach issues due to previous NSAID use.  States she is either constipated or has diarrhea, has lost 10 lbs in the last 3 months.      Pt was unable to drain baker's cyst and give a cortisone injection as she had just had a cortisone shot 2 months before that.    Pt is really looking for a plan for this knee, as \"her life is ruined because of this knee\".      Will send message onto provider.  Pt aware that I will likely get back to her tomorrow.    Lisa Adkins RN  Rheumatology Clinic    "

## 2017-12-13 NOTE — TELEPHONE ENCOUNTER
"Pt called re: sooner appt below. Rescheduled x 2, asking if could be seen sooner than 3/2018. Also requesting:    Flexeril for fibromyalgia. Says takes edge off pain, previously Rx'd by PCP.    MTX refill. Last dose PM 12/13/17, then will be out.    Reporting Baker's cyst on L knee causing significant pain, feels it \"rubbing on bone\" when she walks, also hurts at rest.    Please send any Rx's to [facility] [address]. Can be reached at [phone]. Sent to HAMZAH Gonzalez.  "

## 2017-12-14 RX ORDER — CYCLOBENZAPRINE HCL 5 MG
10 TABLET ORAL 3 TIMES DAILY PRN
Qty: 60 TABLET | Refills: 0 | Status: SHIPPED | OUTPATIENT
Start: 2017-12-14

## 2017-12-14 NOTE — TELEPHONE ENCOUNTER
Discussed with Dr. Gamez, she would like pt to increase Methotrexate to 15 mg a week, needs to have a brace for her knee and also needs a referral to ortho to be seen for the baker's cyst.     Called and discussed with pt, Dr. Gamez's recommendations. Pt is on board.  Aware that she will need labs 4 weeks after increasing Methotrexate.  Will send orthopedics referral and lab order directly to pt.      Cued up prescription for Flexaril, Dr. Gamez is willing to sign.    Lisa Adkins RN  Rheumatology Clinic

## 2017-12-15 ENCOUNTER — TELEPHONE (OUTPATIENT)
Dept: RHEUMATOLOGY | Facility: CLINIC | Age: 32
End: 2017-12-15

## 2017-12-15 NOTE — TELEPHONE ENCOUNTER
Patient called and requesting the knee brace rx sent to a new pharmacy with the following info:  [place], [place]  Fax: [phone]  Rx Desc: Kind of brace, diagnosis, date of birth, how long its needed, the doctor signature, printed name and NPI.   Please advise.   Bettie Ospina LPN

## 2017-12-15 NOTE — TELEPHONE ENCOUNTER
Called and updated pt that Dr. Gamez will not be in clinic until next Thursday. Discussed that she will attempt to have her PCP order the elastic brace for her knee.      Pt was given instructions to contact medical records in order to get [facility] her recoreds.    Ortho appt at [facility] 12/21.     Lisa Adkins RN  Rheumatology Clinic

## 2018-01-21 ENCOUNTER — HEALTH MAINTENANCE LETTER (OUTPATIENT)
Age: 33
End: 2018-01-21

## 2018-03-27 DIAGNOSIS — M19.90 INFLAMMATORY ARTHRITIS: ICD-10-CM

## 2018-03-27 NOTE — TELEPHONE ENCOUNTER
methotrexate 2.5 MG tablet CHEMO      Last Written Prescription Date:  12/14/17  Last Fill Quantity: 24,   # refills: 2  Last Office Visit: 9/14/17  Future Office visit:  5/24/18    CBC RESULTS:   Recent Labs   Lab Test  11/16/17 1628 08/09/17   1016   WBC  13.0*  10.2   RBC  4.29  4.08   HGB  14.2  13.3   HCT  41.2  39.3   MCV  96.0  96   MCH   --   32.6   MCHC   --   33.8   RDW  12.4  12.1   PLT  203  209       Creatinine   Date Value Ref Range Status   08/09/2017 0.67 0.52 - 1.04 mg/dL Final   ]    Liver Function Studies -   Recent Labs   Lab Test  11/16/17 1628 08/09/17   1016   PROTTOTAL   --   6.9   ALBUMIN   --   3.2*   BILITOTAL   --   0.4   ALKPHOS   --   75   AST  19  14   ALT  17  19       Routing refill request to provider for review/approval because:  DMARD.  Labs and last visit past due/protocol

## 2018-03-30 LAB
ALBUMIN SERPL-MCNC: 4.2 G/DL (ref 3.5–5)
ALT SERPL-CCNC: 20 U/L (ref 7–45)
AST SERPL-CCNC: 19 U/L (ref 8–43)
BASOPHILS NFR BLD AUTO: ABNORMAL %
CREAT SERPL-MCNC: 0.68 MG/DL (ref 0.59–1.04)
EOSINOPHIL NFR BLD AUTO: ABNORMAL %
ERYTHROCYTE [DISTWIDTH] IN BLOOD BY AUTOMATED COUNT: 12.2 % (ref 12.2–16.1)
GFR SERPL CREATININE-BSD FRML MDRD: >90 ML/MIN/1.73M2
HCT VFR BLD AUTO: 41.7 % (ref 35.5–44.9)
HEMOGLOBIN: 13.7 G/DL (ref 11.7–15.7)
LYMPHOCYTES NFR BLD AUTO: ABNORMAL %
MCH RBC QN AUTO: ABNORMAL PG
MCHC RBC AUTO-ENTMCNC: ABNORMAL G/DL
MCV RBC AUTO: 99 FL (ref 78.2–97.9)
MONOCYTES NFR BLD AUTO: ABNORMAL %
NEUTROPHILS NFR BLD AUTO: ABNORMAL %
PLATELET COUNT - QUEST: 206 10^9/L (ref 150–450)
RBC # BLD AUTO: ABNORMAL 10^12/L
WBC # BLD AUTO: ABNORMAL 10^9/L

## 2018-06-20 ENCOUNTER — TELEPHONE (OUTPATIENT)
Dept: RHEUMATOLOGY | Facility: CLINIC | Age: 33
End: 2018-06-20

## 2018-06-22 ENCOUNTER — TELEPHONE (OUTPATIENT)
Dept: RHEUMATOLOGY | Facility: CLINIC | Age: 33
End: 2018-06-22

## 2018-06-22 NOTE — TELEPHONE ENCOUNTER
[facility]    Phone Message    May a detailed message be left on voicemail: yes    Reason for Call: Order(s): Other:   Reason for requested: Lab orders  Date needed: 6/22/2018  Provider name: Dr. Gamez - Geremias pt, was sent to wrong fax. Please refax lab orders to Reid Driver of [facility] fax# [phone] Attn: Cori Mendes. Please close when done. Thank you      Action Taken: Message routed to:  [facility] ([facility] Rheum

## 2018-06-26 ENCOUNTER — TELEPHONE (OUTPATIENT)
Dept: RHEUMATOLOGY | Facility: CLINIC | Age: 33
End: 2018-06-26

## 2018-06-26 DIAGNOSIS — M19.90 INFLAMMATORY ARTHRITIS: Primary | ICD-10-CM

## 2018-06-26 DIAGNOSIS — Z79.899 HIGH RISK MEDICATIONS (NOT ANTICOAGULANTS) LONG-TERM USE: ICD-10-CM

## 2018-06-26 NOTE — TELEPHONE ENCOUNTER
[facility]    Phone Message    May a detailed message be left on voicemail: yes    Reason for Call: Other: Carla from the [facility] is calling they recieved an order for blood work and they are uncretain is it a standing order?  Or how oftern does it need to be completed please ford the lab at [phone] .      Action Taken: Message routed to:  [facility] Rheum

## 2018-06-27 NOTE — TELEPHONE ENCOUNTER
LVM for patient to call back,  Unsure if she wanted to just done a one time lab draw at that location or if they should be standing orders, unclear from previous call documentation.

## 2018-07-02 DIAGNOSIS — M19.90 INFLAMMATORY ARTHRITIS: ICD-10-CM

## 2018-07-05 NOTE — TELEPHONE ENCOUNTER
methotrexate 2.5 MG tablet CHEMO  Last Written Prescription Date:  3/27/18  Last Fill Quantity: 24,   # refills: 1  Last Office Visit : 5/4/18  Future Office visit:  8/30/18      CBC RESULTS:   Recent Labs   Lab Test 01/16/18 11/16/17   1628   08/09/17   1016   WBC   --   13.0*   --   10.2   RBC   --   4.29   --   4.08   HGB  13.7  14.2   --   13.3   HCT  41.7  41.2   --   39.3   MCV  99.0*  96.0   --   96   MCH   --    --    --   32.6   MCHC   --    --    --   33.8   RDW  12.2  12.4   --   12.1   PLT  206  203   < >  209    < > = values in this interval not displayed.       Creatinine   Date Value Ref Range Status   01/16/2018 0.68 0.59 - 1.04 mg/dL Final   ]    Liver Function Studies -   Recent Labs   Lab Test 01/16/18 08/09/17   1016   PROTTOTAL   --    --   6.9   ALBUMIN  4.2   --   3.2*   BILITOTAL   --    --   0.4   ALKPHOS   --    --   75   AST  19   < >  14   ALT  20   < >  19    < > = values in this interval not displayed.     Lab Results   Component Value Date    ALBUMIN 4.2 01/16/2018       Routing refill request to provider for review/approval because:  Lab past due

## 2018-07-05 NOTE — TELEPHONE ENCOUNTER
Pt is on Methotrexate, needs to have standing lab orders.  They do not have standing lab orders down at that clinic.    Have faxed standing lab orders to clinic.  Fax # [phone].    Lisa Adkins RN  [facility]

## 2018-07-06 NOTE — TELEPHONE ENCOUNTER
Lab orders were faxed to clinic.  My Chart message sent requesting pt get labs done.    Lisa Adkins RN  [facility]

## 2018-07-16 LAB
ALBUMIN SERPL-MCNC: 4.3 G/DL (ref 3.5–5)
ALT SERPL-CCNC: 15 U/L (ref 7–45)
AST SERPL-CCNC: 22 U/L (ref 8–43)
BASOPHILS NFR BLD AUTO: 0.02 % (ref 0.01–?)
CREAT SERPL-MCNC: 0.61 MG/DL (ref 0.59–1.04)
EOSINOPHIL NFR BLD AUTO: 0.19 % (ref 0.03–?)
ERYTHROCYTE [DISTWIDTH] IN BLOOD BY AUTOMATED COUNT: 11.7 % (ref 12.2–16.1)
GFR SERPL CREATININE-BSD FRML MDRD: >90 ML/MIN/1.73M2
HCT VFR BLD AUTO: 38.6 % (ref 35.5–44.9)
HEMOGLOBIN: 13.4 G/DL (ref 11.6–15)
LYMPHOCYTES NFR BLD AUTO: 2.71 % (ref 0.95–?)
MCH RBC QN AUTO: ABNORMAL PG
MCV RBC AUTO: 98.5 FL (ref 78.2–97.9)
MONOCYTES NFR BLD AUTO: 0.59 % (ref 0.26–?)
NEUTROPHILS NFR BLD AUTO: 5.37 % (ref 1.56–?)
PLATELET COUNT - QUEST: 235 10^9/L (ref 157–371)
RBC # BLD AUTO: 4 10^12/L (ref 3.92–5.13)
WBC # BLD AUTO: ABNORMAL 10^9/L
WBC #/AREA URNS HPF: 8.9 /[HPF] (ref 3.4–9.6)

## 2018-07-16 NOTE — PROGRESS NOTES
Outside labs abstracted from [facility].   Marianela Ocasio CMA  7/16/2018 10:01 AM

## 2018-09-03 DIAGNOSIS — M19.90 INFLAMMATORY ARTHRITIS: ICD-10-CM

## 2018-09-04 NOTE — TELEPHONE ENCOUNTER
Prednisone 2.5 mg   Taper prednisone as follows: 17.5 mg daily for 1 week, then 15 mg daily for 1 week, then 12.5 mg daily for 1 week, then 10 mg daily and stay on 10 until next visit.  Last Written Prescription Date: 9-14-17  Last Fill Quantity: 90,   # refills: 1  Last Office Visit : 9-14-17  Future Office visit:  none    Routing refill request to provider for review/approval because: clarification repeat taper?   Rx pending.

## 2018-09-06 RX ORDER — PREDNISONE 2.5 MG/1
TABLET ORAL
Qty: 90 TABLET | Refills: 1 | Status: SHIPPED | OUTPATIENT
Start: 2018-09-06

## 2020-03-11 ENCOUNTER — HEALTH MAINTENANCE LETTER (OUTPATIENT)
Age: 35
End: 2020-03-11

## 2020-12-27 ENCOUNTER — HEALTH MAINTENANCE LETTER (OUTPATIENT)
Age: 35
End: 2020-12-27

## 2021-04-24 ENCOUNTER — HEALTH MAINTENANCE LETTER (OUTPATIENT)
Age: 36
End: 2021-04-24

## 2021-08-13 NOTE — TELEPHONE ENCOUNTER
Called and spoke with Dr. Gamez regarding pt's previous messages. Discussed with her, MRI results do show swelling and that knee does need to be drained.  Dr. Gamez would like pt to come to clinic on Friday September 1st at 11:30 to have the knee looked at.  She would also like pt to start on prednisone at 30 mg daily x 7 days, and will discuss taper when pt is in clinic on Friday.    Called to discuss Dr. Gamez's response, pt is very tearful at this time, and states that she called the nurse line due to numbness in her foot on the same leg as the knee swelling.  She had not heard back from the nurse line.  Let pt know what Dr. Gamez would like her to do, and when she can be seen and the amount of prednisone.  Pt understands.  Is requesting zofran to go along with the prednisone, as it does make her nauseated.  Let her know I would ask Dr. Gamez but cannot guarantee that she will sign for Zofran.  Pt understands.  Will be here on Friday.    Lisa Adkins RN  [facility]           Alert-The patient is alert, awake and responds to voice. The patient is oriented to time, place, and person. The triage nurse is able to obtain subjective information.

## 2021-10-09 ENCOUNTER — HEALTH MAINTENANCE LETTER (OUTPATIENT)
Age: 36
End: 2021-10-09

## 2022-05-21 ENCOUNTER — HEALTH MAINTENANCE LETTER (OUTPATIENT)
Age: 37
End: 2022-05-21

## 2022-09-17 ENCOUNTER — HEALTH MAINTENANCE LETTER (OUTPATIENT)
Age: 37
End: 2022-09-17